# Patient Record
Sex: MALE | Race: WHITE | Employment: OTHER | URBAN - METROPOLITAN AREA
[De-identification: names, ages, dates, MRNs, and addresses within clinical notes are randomized per-mention and may not be internally consistent; named-entity substitution may affect disease eponyms.]

---

## 2017-03-14 DIAGNOSIS — R74.8 ELEVATED LIVER ENZYMES: ICD-10-CM

## 2017-03-14 DIAGNOSIS — E78.5 DYSLIPIDEMIA: ICD-10-CM

## 2017-03-14 DIAGNOSIS — K76.0 FATTY LIVER: ICD-10-CM

## 2017-03-14 NOTE — TELEPHONE ENCOUNTER
Medication refill. Requested Prescriptions     Pending Prescriptions Disp Refills    ezetimibe (ZETIA) 10 mg tablet 30 Tab 2     Sig: Take 1 Tab by mouth daily.  Repeat liver and lipid labs needed one month  Indications: hyperlipidemia

## 2017-03-17 RX ORDER — EZETIMIBE 10 MG/1
10 TABLET ORAL DAILY
Qty: 30 TAB | Refills: 2 | Status: SHIPPED | OUTPATIENT
Start: 2017-03-17 | End: 2017-05-17 | Stop reason: SDUPTHER

## 2017-05-17 ENCOUNTER — OFFICE VISIT (OUTPATIENT)
Dept: FAMILY MEDICINE CLINIC | Age: 42
End: 2017-05-17

## 2017-05-17 VITALS
BODY MASS INDEX: 42.13 KG/M2 | RESPIRATION RATE: 18 BRPM | WEIGHT: 278 LBS | HEIGHT: 68 IN | HEART RATE: 84 BPM | DIASTOLIC BLOOD PRESSURE: 104 MMHG | SYSTOLIC BLOOD PRESSURE: 132 MMHG | TEMPERATURE: 97.4 F | OXYGEN SATURATION: 95 %

## 2017-05-17 DIAGNOSIS — J02.9 SORE THROAT: ICD-10-CM

## 2017-05-17 DIAGNOSIS — E78.00 HIGH CHOLESTEROL: ICD-10-CM

## 2017-05-17 DIAGNOSIS — I11.9 BENIGN HYPERTENSIVE HEART DISEASE WITHOUT HEART FAILURE: ICD-10-CM

## 2017-05-17 DIAGNOSIS — E11.65 TYPE 2 DIABETES MELLITUS WITH HYPERGLYCEMIA, WITHOUT LONG-TERM CURRENT USE OF INSULIN (HCC): Primary | ICD-10-CM

## 2017-05-17 DIAGNOSIS — K76.0 FATTY LIVER: ICD-10-CM

## 2017-05-17 DIAGNOSIS — R74.8 ELEVATED LIVER ENZYMES: ICD-10-CM

## 2017-05-17 DIAGNOSIS — E78.5 DYSLIPIDEMIA: ICD-10-CM

## 2017-05-17 LAB
S PYO AG THROAT QL: POSITIVE
VALID INTERNAL CONTROL?: YES

## 2017-05-17 RX ORDER — HYDROCHLOROTHIAZIDE 25 MG/1
TABLET ORAL
Qty: 90 TAB | Refills: 1 | Status: SHIPPED | OUTPATIENT
Start: 2017-05-17 | End: 2017-10-05 | Stop reason: SDUPTHER

## 2017-05-17 RX ORDER — AMOXICILLIN 500 MG/1
500 CAPSULE ORAL 3 TIMES DAILY
Qty: 30 CAP | Refills: 0 | Status: SHIPPED | OUTPATIENT
Start: 2017-05-17 | End: 2017-05-27

## 2017-05-17 RX ORDER — ATORVASTATIN CALCIUM 10 MG/1
10 TABLET, FILM COATED ORAL DAILY
Qty: 90 TAB | Refills: 1 | Status: SHIPPED | OUTPATIENT
Start: 2017-05-17 | End: 2017-10-05 | Stop reason: SDUPTHER

## 2017-05-17 RX ORDER — METFORMIN HYDROCHLORIDE 500 MG/1
500 TABLET ORAL
Qty: 90 TAB | Refills: 1 | Status: SHIPPED | OUTPATIENT
Start: 2017-05-17 | End: 2017-10-05 | Stop reason: SDUPTHER

## 2017-05-17 RX ORDER — BLOOD-GLUCOSE CONTROL, NORMAL
EACH MISCELLANEOUS
Qty: 200 LANCET | Refills: 1 | Status: SHIPPED | OUTPATIENT
Start: 2017-05-17

## 2017-05-17 RX ORDER — EZETIMIBE 10 MG/1
10 TABLET ORAL DAILY
Qty: 90 TAB | Refills: 1 | Status: SHIPPED | OUTPATIENT
Start: 2017-05-17 | End: 2017-07-07 | Stop reason: SDUPTHER

## 2017-05-17 RX ORDER — LISINOPRIL 10 MG/1
10 TABLET ORAL DAILY
Qty: 90 TAB | Refills: 1 | Status: SHIPPED | OUTPATIENT
Start: 2017-05-17 | End: 2017-10-05 | Stop reason: SDUPTHER

## 2017-05-17 NOTE — PROGRESS NOTES
Adan Bolanos is a 38 y.o. male presents to office for DM, HTN and cholesterol. 1. Have you been to the ER, urgent care clinic or hospitalized since your last visit? no  2. Have you seen any other providers outside of [facility] since your last visit? no  3.  Have you had a Flu shot this year? no      Health Maintenance items with a due date reviewed with patient:  Health Maintenance Due   Topic Date Due    FOOT EXAM Q1  04/25/1983    MICROALBUMIN Q1  04/25/1983    EYE EXAM RETINAL OR DILATED Q1  04/25/1983    Pneumococcal 19-64 Medium Risk (1 of 1 - PPSV23) 04/25/1992    DTaP/Tdap/Td series (1 - Tdap) 04/25/1994    HEMOGLOBIN A1C Q6M  05/10/2017

## 2017-05-17 NOTE — PATIENT INSTRUCTIONS
Learning About Diabetes Food Guidelines  Your Care Instructions  Meal planning is important to manage diabetes. It helps keep your blood sugar at a target level (which you set with your doctor). You don't have to eat special foods. You can eat what your family eats, including sweets once in a while. But you do have to pay attention to how often you eat and how much you eat of certain foods. You may want to work with a dietitian or a certified diabetes educator (CDE) to help you plan meals and snacks. A dietitian or CDE can also help you lose weight if that is one of your goals. What should you know about eating carbs? Managing the amount of carbohydrate (carbs) you eat is an important part of healthy meals when you have diabetes. Carbohydrate is found in many foods. · Learn which foods have carbs. And learn the amounts of carbs in different foods. ¨ Bread, cereal, pasta, and rice have about 15 grams of carbs in a serving. A serving is 1 slice of bread (1 ounce), ½ cup of cooked cereal, or 1/3 cup of cooked pasta or rice. ¨ Fruits have 15 grams of carbs in a serving. A serving is 1 small fresh fruit, such as an apple or orange; ½ of a banana; ½ cup of cooked or canned fruit; ½ cup of fruit juice; 1 cup of melon or raspberries; or 2 tablespoons of dried fruit. ¨ Milk and no-sugar-added yogurt have 15 grams of carbs in a serving. A serving is 1 cup of milk or 2/3 cup of no-sugar-added yogurt. ¨ Starchy vegetables have 15 grams of carbs in a serving. A serving is ½ cup of mashed potatoes or sweet potato; 1 cup winter squash; ½ of a small baked potato; ½ cup of cooked beans; or ½ cup cooked corn or green peas. · Learn how much carbs to eat each day and at each meal. A dietitian or CDE can teach you how to keep track of the amount of carbs you eat. This is called carbohydrate counting. · If you are not sure how to count carbohydrate grams, use the Plate Method to plan meals.  It is a good, quick way to make sure that you have a balanced meal. It also helps you spread carbs throughout the day. ¨ Divide your plate by types of foods. Put non-starchy vegetables on half the plate, meat or other protein food on one-quarter of the plate, and a grain or starchy vegetable in the final quarter of the plate. To this you can add a small piece of fruit and 1 cup of milk or yogurt, depending on how many carbs you are supposed to eat at a meal.  · Try to eat about the same amount of carbs at each meal. Do not \"save up\" your daily allowance of carbs to eat at one meal.  · Proteins have very little or no carbs per serving. Examples of proteins are beef, chicken, turkey, fish, eggs, tofu, cheese, cottage cheese, and peanut butter. A serving size of meat is 3 ounces, which is about the size of a deck of cards. Examples of meat substitute serving sizes (equal to 1 ounce of meat) are 1/4 cup of cottage cheese, 1 egg, 1 tablespoon of peanut butter, and ½ cup of tofu. How can you eat out and still eat healthy? · Learn to estimate the serving sizes of foods that have carbohydrate. If you measure food at home, it will be easier to estimate the amount in a serving of restaurant food. · If the meal you order has too much carbohydrate (such as potatoes, corn, or baked beans), ask to have a low-carbohydrate food instead. Ask for a salad or green vegetables. · If you use insulin, check your blood sugar before and after eating out to help you plan how much to eat in the future. · If you eat more carbohydrate at a meal than you had planned, take a walk or do other exercise. This will help lower your blood sugar. What else should you know? · Limit saturated fat, such as the fat from meat and dairy products. This is a healthy choice because people who have diabetes are at higher risk of heart disease. So choose lean cuts of meat and nonfat or low-fat dairy products. Use olive or canola oil instead of butter or shortening when cooking.   · Don't skip meals. Your blood sugar may drop too low if you skip meals and take insulin or certain medicines for diabetes. · Check with your doctor before you drink alcohol. Alcohol can cause your blood sugar to drop too low. Alcohol can also cause a bad reaction if you take certain diabetes medicines. Follow-up care is a key part of your treatment and safety. Be sure to make and go to all appointments, and call your doctor if you are having problems. It's also a good idea to know your test results and keep a list of the medicines you take. Where can you learn more? Go to http://angie-jimi.info/. Enter J638 in the search box to learn more about \"Learning About Diabetes Food Guidelines. \"  Current as of: May 23, 2016  Content Version: 11.2  © 1561-7004 [employer]. Care instructions adapted under license by [employer] (which disclaims liability or warranty for this information). If you have questions about a medical condition or this instruction, always ask your healthcare professional. [place] [address] any warranty or liability for your use of this information. Low Sodium Diet (2,000 Milligram): Care Instructions  Your Care Instructions  Too much sodium causes your body to hold on to extra water. This can raise your blood pressure and force your heart and kidneys to work harder. In very serious cases, this could cause you to be put in the hospital. It might even be life-threatening. By limiting sodium, you will feel better and lower your risk of serious problems. The most common source of sodium is salt. People get most of the salt in their diet from canned, prepared, and packaged foods. Fast food and restaurant meals also are very high in sodium. Your doctor will probably limit your sodium to less than 2,000 milligrams (mg) a day. This limit counts all the sodium in prepared and packaged foods and any salt you add to your food.   Follow-up care is a key part of your treatment and safety. Be sure to make and go to all appointments, and call your doctor if you are having problems. It's also a good idea to know your test results and keep a list of the medicines you take. How can you care for yourself at home? Read food labels  · Read labels on cans and food packages. The labels tell you how much sodium is in each serving. Make sure that you look at the serving size. If you eat more than the serving size, you have eaten more sodium. · Food labels also tell you the Percent Daily Value for sodium. Choose products with low Percent Daily Values for sodium. · Be aware that sodium can come in forms other than salt, including monosodium glutamate (MSG), sodium citrate, and sodium bicarbonate (baking soda). MSG is often added to Asian food. When you eat out, you can sometimes ask for food without MSG or added salt. Buy low-sodium foods  · Buy foods that are labeled \"unsalted\" (no salt added), \"sodium-free\" (less than 5 mg of sodium per serving), or \"low-sodium\" (less than 140 mg of sodium per serving). Foods labeled \"reduced-sodium\" and \"light sodium\" may still have too much sodium. Be sure to read the label to see how much sodium you are getting. · Buy fresh vegetables, or frozen vegetables without added sauces. Buy low-sodium versions of canned vegetables, soups, and other canned goods. Prepare low-sodium meals  · Cut back on the amount of salt you use in cooking. This will help you adjust to the taste. Do not add salt after cooking. One teaspoon of salt has about 2,300 mg of sodium. · Take the salt shaker off the table. · Flavor your food with garlic, lemon juice, onion, vinegar, herbs, and spices. Do not use soy sauce, lite soy sauce, steak sauce, onion salt, garlic salt, celery salt, mustard, or ketchup on your food. · Use low-sodium salad dressings, sauces, and ketchup. Or make your own salad dressings and sauces without adding salt.   · Use less salt (or none) when recipes call for it. You can often use half the salt a recipe calls for without losing flavor. Other foods such as rice, pasta, and grains do not need added salt. · Rinse canned vegetables, and cook them in fresh water. This removes somebut not allof the salt. · Avoid water that is naturally high in sodium or that has been treated with water softeners, which add sodium. Call your local water company to find out the sodium content of your water supply. If you buy bottled water, read the label and choose a sodium-free brand. Avoid high-sodium foods  · Avoid eating:  ¨ Smoked, cured, salted, and canned meat, fish, and poultry. ¨ Ham, simeon, hot dogs, and luncheon meats. ¨ Regular, hard, and processed cheese and regular peanut butter. ¨ Crackers with salted tops, and other salted snack foods such as pretzels, chips, and salted popcorn. ¨ Frozen prepared meals, unless labeled low-sodium. ¨ Canned and dried soups, broths, and bouillon, unless labeled sodium-free or low-sodium. ¨ Canned vegetables, unless labeled sodium-free or low-sodium. ¨ [place] fries, pizza, tacos, and other fast foods. ¨ Pickles, olives, ketchup, and other condiments, especially soy sauce, unless labeled sodium-free or low-sodium. Where can you learn more? Go to http://angie-jimi.info/. Enter S436 in the search box to learn more about \"Low Sodium Diet (2,000 Milligram): Care Instructions. \"  Current as of: July 26, 2016  Content Version: 11.2  © 3864-7283 [employer]. Care instructions adapted under license by [employer] (which disclaims liability or warranty for this information). If you have questions about a medical condition or this instruction, always ask your healthcare professional. [place] [address] any warranty or liability for your use of this information.        DASH Diet: Care Instructions  Your Care Instructions  The DASH diet is an eating plan that can help lower your blood pressure. DASH stands for Dietary Approaches to Stop Hypertension. Hypertension is high blood pressure. The DASH diet focuses on eating foods that are high in calcium, potassium, and magnesium. These nutrients can lower blood pressure. The foods that are highest in these nutrients are fruits, vegetables, low-fat dairy products, nuts, seeds, and legumes. But taking calcium, potassium, and magnesium supplements instead of eating foods that are high in those nutrients does not have the same effect. The DASH diet also includes whole grains, fish, and poultry. The DASH diet is one of several lifestyle changes your doctor may recommend to lower your high blood pressure. Your doctor may also want you to decrease the amount of sodium in your diet. Lowering sodium while following the DASH diet can lower blood pressure even further than just the DASH diet alone. Follow-up care is a key part of your treatment and safety. Be sure to make and go to all appointments, and call your doctor if you are having problems. It's also a good idea to know your test results and keep a list of the medicines you take. How can you care for yourself at home? Following the DASH diet  · Eat 4 to 5 servings of fruit each day. A serving is 1 medium-sized piece of fruit, ½ cup chopped or canned fruit, 1/4 cup dried fruit, or 4 ounces (½ cup) of fruit juice. Choose fruit more often than fruit juice. · Eat 4 to 5 servings of vegetables each day. A serving is 1 cup of lettuce or raw leafy vegetables, ½ cup of chopped or cooked vegetables, or 4 ounces (½ cup) of vegetable juice. Choose vegetables more often than vegetable juice. · Get 2 to 3 servings of low-fat and fat-free dairy each day. A serving is 8 ounces of milk, 1 cup of yogurt, or 1 ½ ounces of cheese. · Eat 6 to 8 servings of grains each day.  A serving is 1 slice of bread, 1 ounce of dry cereal, or ½ cup of cooked rice, pasta, or cooked cereal. Try to choose whole-grain products as much as possible. · Limit lean meat, poultry, and fish to 2 servings each day. A serving is 3 ounces, about the size of a deck of cards. · Eat 4 to 5 servings of nuts, seeds, and legumes (cooked dried beans, lentils, and split peas) each week. A serving is 1/3 cup of nuts, 2 tablespoons of seeds, or ½ cup of cooked beans or peas. · Limit fats and oils to 2 to 3 servings each day. A serving is 1 teaspoon of vegetable oil or 2 tablespoons of salad dressing. · Limit sweets and added sugars to 5 servings or less a week. A serving is 1 tablespoon jelly or jam, ½ cup sorbet, or 1 cup of lemonade. · Eat less than 2,300 milligrams (mg) of sodium a day. If you limit your sodium to 1,500 mg a day, you can lower your blood pressure even more. Tips for success  · Start small. Do not try to make dramatic changes to your diet all at once. You might feel that you are missing out on your favorite foods and then be more likely to not follow the plan. Make small changes, and stick with them. Once those changes become habit, add a few more changes. · Try some of the following:  ¨ Make it a goal to eat a fruit or vegetable at every meal and at snacks. This will make it easy to get the recommended amount of fruits and vegetables each day. ¨ Try yogurt topped with fruit and nuts for a snack or healthy dessert. ¨ Add lettuce, tomato, cucumber, and onion to sandwiches. ¨ Combine a ready-made pizza crust with low-fat mozzarella cheese and lots of vegetable toppings. Try using tomatoes, squash, spinach, broccoli, carrots, cauliflower, and onions. ¨ Have a variety of cut-up vegetables with a low-fat dip as an appetizer instead of chips and dip. ¨ Sprinkle sunflower seeds or chopped almonds over salads. Or try adding chopped walnuts or almonds to cooked vegetables. ¨ Try some vegetarian meals using beans and peas. Add garbanzo or kidney beans to salads.  Make burritos and tacos with mashed cote beans or black beans. Where can you learn more? Go to http://angie-jimi.info/. Enter A941 in the search box to learn more about \"DASH Diet: Care Instructions. \"  Current as of: March 23, 2016  Content Version: 11.2  © 8705-9810 [employer]. Care instructions adapted under license by [employer] (which disclaims liability or warranty for this information). If you have questions about a medical condition or this instruction, always ask your healthcare professional. [address] any warranty or liability for your use of this information.

## 2017-05-17 NOTE — PROGRESS NOTES
Adan Bolanos is a 38 y.o.  male and presents with f/U for med refills and monitoring labs for chronic conditions below. He also has several days of sore throat and cough. Chief Complaint   Patient presents with    Diabetes    Hypertension    Cholesterol Problem     Subjective: Additional Concerns: none    Patient Active Problem List    Diagnosis Date Noted    Type 2 diabetes mellitus with hyperglycemia, without long-term current use of insulin ([address] 12/29/2016    Fatty liver 06/03/2016    Hypertension     Dyslipidemia     Abnormal EKG     Sleep apnea      Current Outpatient Prescriptions   Medication Sig Dispense Refill    ezetimibe (ZETIA) 10 mg tablet Take 1 Tab by mouth daily. Repeat liver and lipid labs needed one month  Indications: hyperlipidemia 90 Tab 1    atorvastatin (LIPITOR) 10 mg tablet Take 1 Tab by mouth daily. 90 Tab 1    metFORMIN (GLUCOPHAGE) 500 mg tablet Take 1 Tab by mouth daily (with breakfast). Indications: type 2 diabetes mellitus 90 Tab 1    hydroCHLOROthiazide (HYDRODIURIL) 25 mg tablet TAKE 1 TABLET BY MOUTH DAILY FOR HIGH BLOOD PRESSURE 90 Tab 1    lisinopril (PRINIVIL, ZESTRIL) 10 mg tablet Take 1 Tab by mouth daily. 90 Tab 1    glucose blood VI test strips (ONETOUCH VERIO) strip Pt to test blood sugar one time daily 200 Strip 1    lancets (ONETOUCH DELICA LANCETS) 30 gauge misc Pt to test blood sugar one time daily 200 Lancet 1    amoxicillin (AMOXIL) 500 mg capsule Take 1 Cap by mouth three (3) times daily for 10 days. 30 Cap 0    cpap machine kit by Does Not Apply route. Change BiPAP to 17/13 cm with humidifier. Mask: Simplus FF, medium size. Length of need 99 months. Replace mask and accessories as needed times 12 months. Download data at 30 days and fax at [phone]. Dx-Severe HANNA, Morbid obesity. DME- [facility] 1 Kit 0    hydroCHLOROthiazide (HYDRODIURIL) 25 mg tablet Take 1 Tab by mouth daily.  90 Tab 1     No Known Allergies  Past Medical History: Diagnosis Date    Contact dermatitis     Dyslipidemia     Erectile dysfunction     Fatty liver 6/3/2016    Hypertension     Obesity     Sleep apnea     Type 2 diabetes mellitus with hyperglycemia, without long-term current use of insulin [facility] 12/29/2016     Past Surgical History:   Procedure Laterality Date    HX APPENDECTOMY      HX KNEE ARTHROSCOPY  2013    left knee     Family History   Problem Relation Age of Onset    Hypertension Mother     No Known Problems Father     Cancer Sister      breast    Hypertension Maternal Grandmother     No Known Problems Sister     No Known Problems Sister     No Known Problems Sister      Social History   Substance Use Topics    Smoking status: Former Smoker     Types: Cigarettes     Quit date: 9/1/2014    Smokeless tobacco: Never Used      Comment: quit cigarettes about 4 months ago:9/2014    Alcohol use No     ROS     General: negative for - chills, fatigue, fever, weight change  Psych: negative for - anxiety, depression, irritability or mood swings  ENT: negative for - headaches, hearing change, nasal congestion, oral lesions, sneezing, positive for  sore throat  Endo: negative for - hot flashes, polydipsia/polyuria or temperature intolerance  Resp: negative for - cough, shortness of breath or wheezing  CV: negative for - chest pain, edema or palpitations  GI: negative for - abdominal pain, change in bowel habits, constipation, diarrhea or nausea/vomiting  MSK: negative for - joint pain, joint swelling or muscle pain  Neuro: negative for - confusion, headaches, seizures or weakness    Objective:  Vitals:    05/17/17 1045   BP: (!) 132/104   Pulse: 84   Resp: 18   Temp: 97.4 °F (36.3 °C)   TempSrc: Oral   SpO2: 95%   Weight: 278 lb (126.1 kg)   Height: 5' 7.75\" (1.721 m)     PE    Alert, well appearing, and in no distress, oriented to person, place, and time and overweight  Mental status - alert, oriented to person, place, and time, normal mood, behavior, speech, dress, motor activity, and thought processes  Mouth - mucous membranes moist, pharynx normal without lesions  Chest - clear to auscultation, no wheezes, rales or rhonchi, symmetric air entry  Heart - normal rate, regular rhythm, normal S1, S2, no murmurs, rubs, clicks or gallops  Extremities - peripheral pulses normal, no pedal edema, no clubbing or cyanosis    LABS   Office Visit on 12/28/2016   Component Date Value Ref Range Status    WBC 12/28/2016 11.6* 4.0 - 11.0 K/uL Final    RBC 12/28/2016 4.93  3.80 - 5.80 M/uL Final    HGB 12/28/2016 14.6  13.2 - 17.3 g/dL Final    HCT 12/28/2016 43.7  36.6 - 51.9 % Final    MCV 12/28/2016 89  80 - 95 fL Final    MCH 12/28/2016 30  26 - 34 pg Final    MCHC 12/28/2016 33  32 - 36 g/dL Final    RDW 12/28/2016 12.9  10.0 - 16.0 % Final    PLATELET 39/73/7083 088  140 - 440 K/uL Final    MPV 12/28/2016 10.0  6.0 - 10.8 fL Final    NEUTROPHILS 12/28/2016 49  40 - 75 % Final    Lymphocytes 12/28/2016 39  27 - 45 % Final    MONOCYTES 12/28/2016 8  3 - 9 % Final    EOSINOPHILS 12/28/2016 4  0 - 6 % Final    BASOPHILS 12/28/2016 0  0 - 2 % Final    ABS. NEUTROPHILS 12/28/2016 5.7  1.8 - 7.7 K/uL Final    ABSOLUTE LYMPHOCYTE COUNT 12/28/2016 4.5  1.0 - 4.8 K/uL Final    ABS. MONOCYTES 12/28/2016 0.9  0.1 - 0.9 K/uL Final    ABS. EOSINOPHILS 12/28/2016 0.5  0.0 - 0.5 K/uL Final    ABS.  BASOPHILS 12/28/2016 0.0  0.0 - 0.2 K/uL Final    TSH 12/28/2016 1.80  0.27 - 4.20 mcU/mL Final    Calcitriol (Vit D 1, 25 di-OH) 12/28/2016 46.8  19.9 - 79 pg/mL Final    Comment: Performed at:  [address]  [id number]  : Zaid Coffey MD, Phone:  [phone]         TESTS  Results for orders placed or performed in visit on 12/28/16   CBC WITH AUTOMATED DIFF   Result Value Ref Range    WBC 11.6 (H) 4.0 - 11.0 K/uL    RBC 4.93 3.80 - 5.80 M/uL    HGB 14.6 13.2 - 17.3 g/dL    HCT 43.7 36.6 - 51.9 %    MCV 89 80 - 95 fL    MCH 30 26 - 34 pg    MCHC 33 32 - 36 g/dL    RDW 12.9 10.0 - 16.0 %    PLATELET 280 873 - 285 K/uL    MPV 10.0 6.0 - 10.8 fL    NEUTROPHILS 49 40 - 75 %    Lymphocytes 39 27 - 45 %    MONOCYTES 8 3 - 9 %    EOSINOPHILS 4 0 - 6 %    BASOPHILS 0 0 - 2 %    ABS. NEUTROPHILS 5.7 1.8 - 7.7 K/uL    ABSOLUTE LYMPHOCYTE COUNT 4.5 1.0 - 4.8 K/uL    ABS. MONOCYTES 0.9 0.1 - 0.9 K/uL    ABS. EOSINOPHILS 0.5 0.0 - 0.5 K/uL    ABS. BASOPHILS 0.0 0.0 - 0.2 K/uL   TSH 3RD GENERATION   Result Value Ref Range    TSH 1.80 0.27 - 4.20 mcU/mL   VITAMIN D, 1, 25 DIHYDROXY   Result Value Ref Range    Calcitriol (Vit D 1, 25 di-OH) 46.8 19.9 - 79 pg/mL     Assessment/Plan:      1. Type 2 diabetes mellitus with hyperglycemia, without long-term current use of insulin (HCC)  - LIPID PANEL; Future  - METABOLIC PANEL, COMPREHENSIVE; Future  - HEMOGLOBIN A1C WITH EAG; Future  - metFORMIN (GLUCOPHAGE) 500 mg tablet; Take 1 Tab by mouth daily (with breakfast). Indications: type 2 diabetes mellitus  Dispense: 90 Tab; Refill: 1    2. Dyslipidemia  - ezetimibe (ZETIA) 10 mg tablet; Take 1 Tab by mouth daily. Repeat liver and lipid labs needed one month  Indications: hyperlipidemia  Dispense: 90 Tab; Refill: 1    3. Fatty liver  - ezetimibe (ZETIA) 10 mg tablet; Take 1 Tab by mouth daily. Repeat liver and lipid labs needed one month  Indications: hyperlipidemia  Dispense: 90 Tab; Refill: 1    4. Elevated liver enzymes  - ezetimibe (ZETIA) 10 mg tablet; Take 1 Tab by mouth daily. Repeat liver and lipid labs needed one month  Indications: hyperlipidemia  Dispense: 90 Tab; Refill: 1    5. High cholesterol  - atorvastatin (LIPITOR) 10 mg tablet; Take 1 Tab by mouth daily. Dispense: 90 Tab; Refill: 1    6. Hypertension uncontrolled  - added lisinopril 10 mg po daily.  Patient to check BP at home to make sure BP less than 130/80.   - hydroCHLOROthiazide (HYDRODIURIL) 25 mg tablet; TAKE 1 TABLET BY MOUTH DAILY FOR HIGH BLOOD PRESSURE Dispense: 90 Tab; Refill: 1    7. Acute strep pharyngitis - Symptomatic treatment plus amox x 10 days . Lab review: orders written for new lab studies as appropriate; see orders    I have discussed the diagnosis with the patient and the intended plan as seen in the above orders. The patient has received an after-visit summary and questions were answered concerning future plans. I have discussed medication side effects and warnings with the patient as well. I have reviewed the plan of care with the patient, accepted their input and they are in agreement with the treatment goals. Follow-up Disposition:  Return in about 6 months (around 11/17/2017), or if symptoms worsen or fail to improve.     Vincenzo Dooley MD

## 2017-05-18 LAB
A-G RATIO,AGRAT: 1.7 RATIO (ref 1.1–2.6)
ALBUMIN SERPL-MCNC: 4.4 G/DL (ref 3.5–5)
ALP SERPL-CCNC: 101 U/L (ref 25–115)
ALT SERPL-CCNC: 66 U/L (ref 5–40)
ANION GAP SERPL CALC-SCNC: 18 MMOL/L
AST SERPL W P-5'-P-CCNC: 34 U/L (ref 10–37)
AVG GLU, 10930: 142 MG/DL (ref 91–123)
BILIRUB SERPL-MCNC: 0.3 MG/DL (ref 0.2–1.2)
BUN SERPL-MCNC: 15 MG/DL (ref 6–22)
CALCIUM SERPL-MCNC: 9.5 MG/DL (ref 8.4–10.4)
CHLORIDE SERPL-SCNC: 99 MMOL/L (ref 98–110)
CHOLEST SERPL-MCNC: 204 MG/DL (ref 110–200)
CO2 SERPL-SCNC: 26 MMOL/L (ref 20–32)
CREAT SERPL-MCNC: 0.5 MG/DL (ref 0.5–1.2)
GFRAA, 66117: >60
GFRNA, 66118: >60
GLOBULIN,GLOB: 2.6 G/DL (ref 2–4)
GLUCOSE SERPL-MCNC: 105 MG/DL (ref 65–99)
HBA1C MFR BLD HPLC: 6.6 % (ref 4.8–5.9)
HDLC SERPL-MCNC: 35 MG/DL (ref 40–59)
LDLC SERPL CALC-MCNC: 139 MG/DL (ref 50–99)
POTASSIUM SERPL-SCNC: 4.1 MMOL/L (ref 3.5–5.5)
PROT SERPL-MCNC: 7 G/DL (ref 6.4–8.3)
SODIUM SERPL-SCNC: 143 MMOL/L (ref 133–145)
TRIGL SERPL-MCNC: 150 MG/DL (ref 40–149)
VLDLC SERPL CALC-MCNC: 30 MG/DL (ref 8–30)

## 2017-05-18 NOTE — PROGRESS NOTES
Pls inform patient in [facility] that we need to increase his lipitor from 1- to 20 mg and his DM med to Janumet XR 50/1000 BID. Pls pend meds and F/U in 3 months. His FLP and A1C essentially the same as last check.

## 2017-06-05 NOTE — TELEPHONE ENCOUNTER
Dr. Reymundo Collins, please see refill request for patient, thank you! Requested Prescriptions     Pending Prescriptions Disp Refills    atorvastatin (LIPITOR) 20 mg tablet 90 Tab 2     Sig: Take 1 Tab by mouth daily.  SITagliptin-metFORMIN (JANUMET) 50-1,000 mg per tablet 180 Tab 2     Sig: Take 1 Tab by mouth two (2) times daily (with meals).

## 2017-06-05 NOTE — TELEPHONE ENCOUNTER
Pt calling f/u on meds that were suppose to be changed.  Pt also following up on cpap & mask he would like to be referred to another dr b/c he can no longer see dr Deangelo Hubbard for it

## 2017-06-06 RX ORDER — ATORVASTATIN CALCIUM 20 MG/1
20 TABLET, FILM COATED ORAL DAILY
Qty: 90 TAB | Refills: 2 | Status: SHIPPED | OUTPATIENT
Start: 2017-06-06 | End: 2017-07-07 | Stop reason: SDUPTHER

## 2017-06-13 ENCOUNTER — TELEPHONE (OUTPATIENT)
Dept: FAMILY MEDICINE CLINIC | Age: 42
End: 2017-06-13

## 2017-06-13 NOTE — TELEPHONE ENCOUNTER
Patient's friend, Celia Rome (who is on permission to release) calling in to state patient feels like he is on too much medication. He overall feels ill, but cant describe the feeling. No c/o chest pain, nausea, vomiting, sob, or dizziness. He never got the lipitor or Janumet because the pharmacy states they didn't get the rx. Spoke with pharmacist and she states that the patient already picked up the lipitor and Janumet, but not the Zetia. Dr. Reymundo Collins,     Should patient still  the Zetia?

## 2017-07-07 DIAGNOSIS — I11.9 BENIGN HYPERTENSIVE HEART DISEASE WITHOUT HEART FAILURE: ICD-10-CM

## 2017-07-07 DIAGNOSIS — R74.8 ELEVATED LIVER ENZYMES: ICD-10-CM

## 2017-07-07 DIAGNOSIS — E78.5 DYSLIPIDEMIA: ICD-10-CM

## 2017-07-07 DIAGNOSIS — K76.0 FATTY LIVER: ICD-10-CM

## 2017-07-07 NOTE — TELEPHONE ENCOUNTER
Pt calling to request medication refill of:  Requested Prescriptions     Pending Prescriptions Disp Refills    hydroCHLOROthiazide (HYDRODIURIL) 25 mg tablet 90 Tab 1     Sig: Take 1 Tab by mouth daily.  atorvastatin (LIPITOR) 20 mg tablet 90 Tab 2     Sig: Take 1 Tab by mouth daily.  ezetimibe (ZETIA) 10 mg tablet 90 Tab 1     Sig: Take 1 Tab by mouth daily. Repeat liver and lipid labs needed one month  Indications: hyperlipidemia          be sent to [facility]   Pt has about 0 tabs remaining. Pt traveled to [facility] which had meds in there     Pts last appt was 5/17  Advised pt of 72 hour time frame for refill requests. Please advise.

## 2017-07-10 NOTE — TELEPHONE ENCOUNTER
Dr. Mahsa Milian, please see refill request for patient of Celine Ring, thank you! Requested Prescriptions     Pending Prescriptions Disp Refills    hydroCHLOROthiazide (HYDRODIURIL) 25 mg tablet 90 Tab 1     Sig: Take 1 Tab by mouth daily.  atorvastatin (LIPITOR) 20 mg tablet 90 Tab 2     Sig: Take 1 Tab by mouth daily.  ezetimibe (ZETIA) 10 mg tablet 90 Tab 1     Sig: Take 1 Tab by mouth daily.  Repeat liver and lipid labs needed one month  Indications: hyperlipidemia

## 2017-07-11 RX ORDER — HYDROCHLOROTHIAZIDE 25 MG/1
25 TABLET ORAL DAILY
Qty: 90 TAB | Refills: 1 | Status: SHIPPED | OUTPATIENT
Start: 2017-07-11 | End: 2017-10-05 | Stop reason: SDUPTHER

## 2017-07-11 RX ORDER — ATORVASTATIN CALCIUM 20 MG/1
20 TABLET, FILM COATED ORAL DAILY
Qty: 90 TAB | Refills: 2 | Status: SHIPPED | OUTPATIENT
Start: 2017-07-11 | End: 2017-10-05 | Stop reason: SDUPTHER

## 2017-07-11 RX ORDER — EZETIMIBE 10 MG/1
10 TABLET ORAL DAILY
Qty: 90 TAB | Refills: 1 | Status: SHIPPED | OUTPATIENT
Start: 2017-07-11 | End: 2017-10-05 | Stop reason: SDUPTHER

## 2017-10-05 ENCOUNTER — OFFICE VISIT (OUTPATIENT)
Dept: FAMILY MEDICINE CLINIC | Age: 42
End: 2017-10-05

## 2017-10-05 VITALS
HEART RATE: 80 BPM | TEMPERATURE: 97.8 F | BODY MASS INDEX: 43.19 KG/M2 | DIASTOLIC BLOOD PRESSURE: 103 MMHG | HEIGHT: 67 IN | WEIGHT: 275.2 LBS | SYSTOLIC BLOOD PRESSURE: 140 MMHG | OXYGEN SATURATION: 97 % | RESPIRATION RATE: 17 BRPM

## 2017-10-05 DIAGNOSIS — R74.8 ELEVATED LIVER ENZYMES: ICD-10-CM

## 2017-10-05 DIAGNOSIS — E78.00 HIGH CHOLESTEROL: ICD-10-CM

## 2017-10-05 DIAGNOSIS — K76.0 FATTY LIVER: ICD-10-CM

## 2017-10-05 DIAGNOSIS — G47.33 OSA (OBSTRUCTIVE SLEEP APNEA): Primary | ICD-10-CM

## 2017-10-05 DIAGNOSIS — E11.65 TYPE 2 DIABETES MELLITUS WITH HYPERGLYCEMIA, WITHOUT LONG-TERM CURRENT USE OF INSULIN (HCC): ICD-10-CM

## 2017-10-05 DIAGNOSIS — I11.9 BENIGN HYPERTENSIVE HEART DISEASE WITHOUT HEART FAILURE: ICD-10-CM

## 2017-10-05 DIAGNOSIS — E78.5 DYSLIPIDEMIA: ICD-10-CM

## 2017-10-05 RX ORDER — EZETIMIBE 10 MG/1
10 TABLET ORAL DAILY
Qty: 90 TAB | Refills: 1 | Status: SHIPPED | OUTPATIENT
Start: 2017-10-05 | End: 2018-01-26 | Stop reason: SDUPTHER

## 2017-10-05 RX ORDER — LISINOPRIL 10 MG/1
10 TABLET ORAL DAILY
Qty: 90 TAB | Refills: 1 | Status: SHIPPED | OUTPATIENT
Start: 2017-10-05 | End: 2017-10-05

## 2017-10-05 RX ORDER — HYDROCHLOROTHIAZIDE 25 MG/1
25 TABLET ORAL DAILY
Qty: 90 TAB | Refills: 1 | Status: SHIPPED | OUTPATIENT
Start: 2017-10-05 | End: 2018-01-26 | Stop reason: SDUPTHER

## 2017-10-05 RX ORDER — LISINOPRIL 20 MG/1
20 TABLET ORAL DAILY
Qty: 90 TAB | Refills: 1 | Status: SHIPPED | OUTPATIENT
Start: 2017-10-05 | End: 2018-01-26 | Stop reason: SDUPTHER

## 2017-10-05 RX ORDER — ATORVASTATIN CALCIUM 20 MG/1
20 TABLET, FILM COATED ORAL DAILY
Qty: 90 TAB | Refills: 2 | Status: SHIPPED | OUTPATIENT
Start: 2017-10-05 | End: 2018-03-01 | Stop reason: SDUPTHER

## 2017-10-05 RX ORDER — METFORMIN HYDROCHLORIDE 500 MG/1
500 TABLET ORAL
Qty: 90 TAB | Refills: 1 | Status: SHIPPED | OUTPATIENT
Start: 2017-10-05 | End: 2018-01-26 | Stop reason: SDUPTHER

## 2017-10-05 RX ORDER — ATORVASTATIN CALCIUM 10 MG/1
10 TABLET, FILM COATED ORAL DAILY
Qty: 90 TAB | Refills: 1 | Status: SHIPPED | OUTPATIENT
Start: 2017-10-05 | End: 2018-01-26 | Stop reason: SDUPTHER

## 2017-10-05 NOTE — PROGRESS NOTES
Wesly Arriaga is a 38 y.o.  male and presents with F/U as HTN, high chol, DM2. Patient has elevated BP today  And apparently elevated at home as well. Chief Complaint   Patient presents with    Hypertension    Diabetes    Cholesterol Problem     Subjective: Additional Concerns: none    Patient Active Problem List    Diagnosis Date Noted    Type 2 diabetes mellitus with hyperglycemia, without long-term current use of insulin ([facility] [address] 12/29/2016    Fatty liver 06/03/2016    Hypertension     Dyslipidemia     Abnormal EKG     Sleep apnea      Current Outpatient Prescriptions   Medication Sig Dispense Refill    hydroCHLOROthiazide (HYDRODIURIL) 25 mg tablet Take 1 Tab by mouth daily. 90 Tab 1    atorvastatin (LIPITOR) 20 mg tablet Take 1 Tab by mouth daily. 90 Tab 2    ezetimibe (ZETIA) 10 mg tablet Take 1 Tab by mouth daily. Repeat liver and lipid labs needed one month  Indications: hyperlipidemia 90 Tab 1    SITagliptin-metFORMIN (JANUMET) 50-1,000 mg per tablet Take 1 Tab by mouth two (2) times daily (with meals). 180 Tab 2    metFORMIN (GLUCOPHAGE) 500 mg tablet Take 1 Tab by mouth daily (with breakfast). Indications: type 2 diabetes mellitus 90 Tab 1    atorvastatin (LIPITOR) 10 mg tablet Take 1 Tab by mouth daily. 90 Tab 1    lisinopril (PRINIVIL, ZESTRIL) 20 mg tablet Take 1 Tab by mouth daily. Indications: hypertension 90 Tab 1    glucose blood VI test strips (ONETOUCH VERIO) strip Pt to test blood sugar one time daily 200 Strip 1    lancets (ONETOUCH DELICA LANCETS) 30 gauge misc Pt to test blood sugar one time daily 200 Lancet 1    cpap machine kit by Does Not Apply route. Change BiPAP to 17/13 cm with humidifier. Mask: Simplus FF, medium size. Length of need 99 months. Replace mask and accessories as needed times 12 months. Download data at 30 days and fax at [phone]. Dx-Severe HANNA, Morbid obesity.  DME- [facility] 1 Kit 0     No Known Allergies  Past Medical History:   Diagnosis Date    Contact dermatitis     Dyslipidemia     Erectile dysfunction     Fatty liver 6/3/2016    Hypertension     Obesity     Sleep apnea     Type 2 diabetes mellitus with hyperglycemia, without long-term current [facility] 12/29/2016     Past Surgical History:   Procedure Laterality Date    HX APPENDECTOMY      HX KNEE ARTHROSCOPY  2013    left knee     Family History   Problem Relation Age of Onset    Hypertension Mother     No Known Problems Father     Cancer Sister      breast    Hypertension Maternal Grandmother     No Known Problems Sister     No Known Problems Sister     No Known Problems Sister      Social History   Substance Use Topics    Smoking status: Former Smoker     Types: Cigarettes     Quit date: 9/1/2014    Smokeless tobacco: Never Used      Comment: quit cigarettes about 4 months ago:9/2014    Alcohol use No     ROS     General: negative for - chills, fatigue, fever, weight change  Endo: negative for - hot flashes, polydipsia/polyuria or temperature intolerance  Resp: negative for - cough, shortness of breath or wheezing  CV: negative for - chest pain, edema or palpitations  MSK: negative for - joint pain, joint swelling or muscle pain  Neuro: negative for - confusion, headaches, seizures or weakness    Objective:  Vitals:    10/05/17 1039   BP: (!) 140/103   Pulse: 80   Resp: 17   Temp: 97.8 °F (36.6 °C)   TempSrc: Oral   SpO2: 97%   Weight: 275 lb 3.2 oz (124.8 kg)   Height: 5' 7\" (1.702 m)   PainSc:   0 - No pain     PE    Alert, well appearing, and in no distress, oriented to person, place, and time and overweight  Mental status - alert, oriented to person, place, and time, normal mood, behavior, speech, dress, motor activity, and thought processes  Chest - clear to auscultation, no wheezes, rales or rhonchi, symmetric air entry  Heart - normal rate, regular rhythm, normal S1, S2, no murmurs, rubs, clicks or gallops  Extremities - peripheral pulses normal, no pedal edema, no clubbing or cyanosis    LABS   Office Visit on 05/17/2017   Component Date Value Ref Range Status    Triglyceride 05/17/2017 150* 40 - 149 mg/dL Final    HDL Cholesterol 05/17/2017 35* 40 - 59 mg/dL Final    Cholesterol, total 05/17/2017 204* 110 - 200 mg/dL Final    LDL, calculated 05/17/2017 139* 50 - 99 mg/dL Final    VLDL, calculated 05/17/2017 30  8 - 30 mg/dL Final    Comment: Test includes cholesterol, HDL cholesterol, triglycerides and LDL. Cholesterol Recommended NCEP guidelines in mg/dL:  Less than 200      Desirable  200 - 239          Borderline High  Greater than or  = to 240   High      Glucose 05/17/2017 105* 65 - 99 mg/dL Final    BUN 05/17/2017 15  6 - 22 mg/dL Final    Creatinine 05/17/2017 0.5  0.5 - 1.2 mg/dL Final    Sodium 05/17/2017 143  133 - 145 mmol/L Final    Potassium 05/17/2017 4.1  3.5 - 5.5 mmol/L Final    Chloride 05/17/2017 99  98 - 110 mmol/L Final    CO2 05/17/2017 26  20 - 32 mmol/L Final    AST (SGOT) 05/17/2017 34  10 - 37 U/L Final    ALT (SGPT) 05/17/2017 66* 5 - 40 U/L Final    Alk. phosphatase 05/17/2017 101  25 - 115 U/L Final    Bilirubin, total 05/17/2017 0.3  0.2 - 1.2 mg/dL Final    Calcium 05/17/2017 9.5  8.4 - 10.4 mg/dL Final    Protein, total 05/17/2017 7.0  6.4 - 8.3 g/dL Final    Albumin 05/17/2017 4.4  3.5 - 5.0 g/dL Final    A-G Ratio 05/17/2017 1.7  1.1 - 2.6 ratio Final    Globulin 05/17/2017 2.6  2.0 - 4.0 g/dL Final    Anion gap 05/17/2017 18.0  mmol/L Final    Comment: Test includes Albumin, Alkaline Phosphatase, ALT, AST, BUN, Calcium, CO2,  Chloride, Creatinine, Glucose, Potassium, Sodium, Total Bilirubin and Total  Protein. Estimated GFR results are reported in mL/min/1.73 sq.m. by the MDRD equation. This eGFR is validated for stable chronic renal failure patients. This   equation  is unreliable in acute illness or patients with normal renal function.       GFRAA 05/17/2017 >60.0  >60.0 Final    GFRNA 05/17/2017 >60.0  >60.0 Final    VALID INTERNAL CONTROL POC 05/17/2017 Yes   Final    Group A Strep Ag 05/17/2017 Positive  Negative Final    Hemoglobin A1c 05/17/2017 6.6* 4.8 - 5.9 % Final    AVG GLU 05/17/2017 142* 91 - 123 mg/dL Final       TESTS  Results for orders placed or performed in visit on 05/17/17   LIPID PANEL   Result Value Ref Range    Triglyceride 150 (H) 40 - 149 mg/dL    HDL Cholesterol 35 (L) 40 - 59 mg/dL    Cholesterol, total 204 (H) 110 - 200 mg/dL    LDL, calculated 139 (H) 50 - 99 mg/dL    VLDL, calculated 30 8 - 30 mg/dL   METABOLIC PANEL, COMPREHENSIVE   Result Value Ref Range    Glucose 105 (H) 65 - 99 mg/dL    BUN 15 6 - 22 mg/dL    Creatinine 0.5 0.5 - 1.2 mg/dL    Sodium 143 133 - 145 mmol/L    Potassium 4.1 3.5 - 5.5 mmol/L    Chloride 99 98 - 110 mmol/L    CO2 26 20 - 32 mmol/L    AST (SGOT) 34 10 - 37 U/L    ALT (SGPT) 66 (H) 5 - 40 U/L    Alk. phosphatase 101 25 - 115 U/L    Bilirubin, total 0.3 0.2 - 1.2 mg/dL    Calcium 9.5 8.4 - 10.4 mg/dL    Protein, total 7.0 6.4 - 8.3 g/dL    Albumin 4.4 3.5 - 5.0 g/dL    A-G Ratio 1.7 1.1 - 2.6 ratio    Globulin 2.6 2.0 - 4.0 g/dL    Anion gap 18.0 mmol/L    GFRAA >60.0 >60.0    GFRNA >60.0 >60.0   HEMOGLOBIN A1C W/O EAG   Result Value Ref Range    Hemoglobin A1c 6.6 (H) 4.8 - 5.9 %    AVG  (H) 91 - 123 mg/dL   AMB POC RAPID STREP A   Result Value Ref Range    VALID INTERNAL CONTROL POC Yes     Group A Strep Ag Positive Negative     Assessment/Plan:      1. Hypertension - uncontrolled. Increased lisinopril from 10 to 20 mg po daily. Patient to monitor BP to make sure comes down to 130/80  Consistently. - hydroCHLOROthiazide (HYDRODIURIL) 25 mg tablet; Take 1 Tab by mouth daily. Dispense: 90 Tab; Refill: 1  - lisinopril (PRINIVIL, ZESTRIL) 20 mg tablet; Take 1 Tab by mouth daily. Indications: hypertension  Dispense: 90 Tab; Refill: 1  - METABOLIC PANEL, COMPREHENSIVE; Future    2. Dyslipidemia  - atorvastatin (LIPITOR) 20 mg tablet;  Take 1 Tab by mouth daily. Dispense: 90 Tab; Refill: 2  - ezetimibe (ZETIA) 10 mg tablet; Take 1 Tab by mouth daily. Repeat liver and lipid labs needed one month  Indications: hyperlipidemia  Dispense: 90 Tab; Refill: 1  - LIPID PANEL; Future    3. Fatty liver  - ezetimibe (ZETIA) 10 mg tablet; Take 1 Tab by mouth daily. Repeat liver and lipid labs needed one month  Indications: hyperlipidemia  Dispense: 90 Tab; Refill: 1  - REFERRAL TO GASTROENTEROLOGY    4. Type 2 diabetes mellitus with hyperglycemia, without long-term current use of insulin (HCC)  - SITagliptin-metFORMIN (JANUMET) 50-1,000 mg per tablet; Take 1 Tab by mouth two (2) times daily (with meals). Dispense: 180 Tab; Refill: 2  - metFORMIN (GLUCOPHAGE) 500 mg tablet; Take 1 Tab by mouth daily (with breakfast). Indications: type 2 diabetes mellitus  Dispense: 90 Tab; Refill: 1  - MICROALBUMIN, UR, RAND W/ MICROALBUMIN/CREA RATIO; Future  - HEMOGLOBIN A1C WITH EAG; Future    5. HANNA (obstructive sleep apnea)  - REFERRAL TO SLEEP STUDIES    Lab review: orders written for new lab studies as appropriate; see orders. I have discussed the diagnosis with the patient and the intended plan as seen in the above orders. The patient has received an after-visit summary and questions were answered concerning future plans. I have discussed medication side effects and warnings with the patient as well. I have reviewed the plan of care with the patient, accepted their input and they are in agreement with the treatment goals. Follow-up Disposition:  Return in about 3 months (around 1/5/2018), or if symptoms worsen or fail to improve.     Linda Naik MD

## 2017-10-05 NOTE — PATIENT INSTRUCTIONS

## 2017-10-05 NOTE — PROGRESS NOTES
Lex Ratliff is a 38 y.o. male presents to office for HTN, DM, and cholesterol. Referral to sleep specialists of [address]  On [facility]  1. Have you been to the ER, urgent care clinic or hospitalized since your last visit?  No            Health Maintenance items with a due date reviewed with patient:  Health Maintenance Due   Topic Date Due    FOOT EXAM Q1  04/25/1983    MICROALBUMIN Q1  04/25/1983    EYE EXAM RETINAL OR DILATED Q1  04/25/1983    Pneumococcal 19-64 Medium Risk (1 of 1 - PPSV23) 04/25/1992    DTaP/Tdap/Td series (1 - Tdap) 04/25/1994    INFLUENZA AGE 9 TO ADULT  08/01/2017

## 2017-10-06 ENCOUNTER — TELEPHONE (OUTPATIENT)
Dept: FAMILY MEDICINE CLINIC | Age: 42
End: 2017-10-06

## 2017-10-06 LAB
A-G RATIO,AGRAT: 1.7 RATIO (ref 1.1–2.6)
ALBUMIN SERPL-MCNC: 4.3 G/DL (ref 3.5–5)
ALP SERPL-CCNC: 108 U/L (ref 25–115)
ALT SERPL-CCNC: 53 U/L (ref 5–40)
ANION GAP SERPL CALC-SCNC: 16 MMOL/L
AST SERPL W P-5'-P-CCNC: 35 U/L (ref 10–37)
AVG GLU, 10930: 131 MG/DL (ref 91–123)
BILIRUB SERPL-MCNC: 0.5 MG/DL (ref 0.2–1.2)
BUN SERPL-MCNC: 7 MG/DL (ref 6–22)
CALCIUM SERPL-MCNC: 9.6 MG/DL (ref 8.4–10.4)
CHLORIDE SERPL-SCNC: 97 MMOL/L (ref 98–110)
CHOLEST SERPL-MCNC: 131 MG/DL (ref 110–200)
CO2 SERPL-SCNC: 29 MMOL/L (ref 20–32)
CREAT SERPL-MCNC: 0.6 MG/DL (ref 0.5–1.2)
CREATININE, URINE: 225 MG/DL
GFRAA, 66117: >60
GFRNA, 66118: >60
GLOBULIN,GLOB: 2.5 G/DL (ref 2–4)
GLUCOSE SERPL-MCNC: 87 MG/DL (ref 70–99)
HBA1C MFR BLD HPLC: 6.2 % (ref 4.8–5.9)
HDLC SERPL-MCNC: 33 MG/DL (ref 40–59)
LDLC SERPL CALC-MCNC: 62 MG/DL (ref 50–99)
MICROALB/CREAT RATIO, 140286: 8.3 MCG/MG OF CREATININE (ref 0–30)
MICROALBUMIN,URINE RANDOM 140054: 18.7 UG/ML (ref 0.1–17)
POTASSIUM SERPL-SCNC: 4.5 MMOL/L (ref 3.5–5.5)
PROT SERPL-MCNC: 6.8 G/DL (ref 6.4–8.3)
SODIUM SERPL-SCNC: 142 MMOL/L (ref 133–145)
TRIGL SERPL-MCNC: 181 MG/DL (ref 40–149)
VLDLC SERPL CALC-MCNC: 36 MG/DL (ref 8–30)

## 2017-10-06 NOTE — PROGRESS NOTES
Pls congratulate patient for reducing his bad chol and A1C. Rest of labs normal to near normal with slight elevation in liver enz. No other intervention is needed  And patient to keep up the good work and avoid alcohol. Recheck in 3 months. FYI patient is Thai speaker.

## 2017-10-06 NOTE — TELEPHONE ENCOUNTER
Patient was informed of lab results and to follow up 3 month. No further question or concern at this time.

## 2018-01-26 ENCOUNTER — OFFICE VISIT (OUTPATIENT)
Dept: FAMILY MEDICINE CLINIC | Age: 43
End: 2018-01-26

## 2018-01-26 VITALS
SYSTOLIC BLOOD PRESSURE: 132 MMHG | RESPIRATION RATE: 17 BRPM | TEMPERATURE: 96.8 F | HEIGHT: 67 IN | WEIGHT: 282.6 LBS | DIASTOLIC BLOOD PRESSURE: 92 MMHG | BODY MASS INDEX: 44.36 KG/M2 | HEART RATE: 103 BPM | OXYGEN SATURATION: 97 %

## 2018-01-26 DIAGNOSIS — R74.8 ELEVATED LIVER ENZYMES: ICD-10-CM

## 2018-01-26 DIAGNOSIS — K76.0 FATTY LIVER: ICD-10-CM

## 2018-01-26 DIAGNOSIS — I10 ESSENTIAL HYPERTENSION: Primary | ICD-10-CM

## 2018-01-26 DIAGNOSIS — E11.65 TYPE 2 DIABETES MELLITUS WITH HYPERGLYCEMIA, WITHOUT LONG-TERM CURRENT USE OF INSULIN (HCC): ICD-10-CM

## 2018-01-26 DIAGNOSIS — I11.9 BENIGN HYPERTENSIVE HEART DISEASE WITHOUT HEART FAILURE: ICD-10-CM

## 2018-01-26 DIAGNOSIS — E78.00 HIGH CHOLESTEROL: ICD-10-CM

## 2018-01-26 DIAGNOSIS — E78.5 DYSLIPIDEMIA: ICD-10-CM

## 2018-01-26 PROBLEM — E66.01 OBESITY, MORBID (HCC): Status: ACTIVE | Noted: 2018-01-26

## 2018-01-26 RX ORDER — LISINOPRIL 20 MG/1
20 TABLET ORAL DAILY
Qty: 90 TAB | Refills: 1 | Status: SHIPPED | OUTPATIENT
Start: 2018-01-26 | End: 2018-01-26 | Stop reason: DRUGHIGH

## 2018-01-26 RX ORDER — LISINOPRIL 40 MG/1
40 TABLET ORAL DAILY
Qty: 90 TAB | Refills: 1 | Status: SHIPPED | OUTPATIENT
Start: 2018-01-26 | End: 2018-04-27 | Stop reason: SDUPTHER

## 2018-01-26 RX ORDER — ATORVASTATIN CALCIUM 10 MG/1
10 TABLET, FILM COATED ORAL DAILY
Qty: 90 TAB | Refills: 1 | Status: SHIPPED | OUTPATIENT
Start: 2018-01-26

## 2018-01-26 RX ORDER — HYDROCHLOROTHIAZIDE 25 MG/1
25 TABLET ORAL DAILY
Qty: 90 TAB | Refills: 1 | Status: SHIPPED | OUTPATIENT
Start: 2018-01-26 | End: 2018-04-27 | Stop reason: SDUPTHER

## 2018-01-26 RX ORDER — EZETIMIBE 10 MG/1
10 TABLET ORAL DAILY
Qty: 90 TAB | Refills: 1 | Status: SHIPPED | OUTPATIENT
Start: 2018-01-26 | End: 2018-04-27 | Stop reason: SDUPTHER

## 2018-01-26 RX ORDER — METFORMIN HYDROCHLORIDE 500 MG/1
500 TABLET ORAL
Qty: 90 TAB | Refills: 1 | Status: SHIPPED | OUTPATIENT
Start: 2018-01-26 | End: 2018-04-27 | Stop reason: SDUPTHER

## 2018-01-26 NOTE — PATIENT INSTRUCTIONS
Learning About Type 2 Diabetes  What is type 2 diabetes? Insulin is a hormone that helps your body use sugar from your food as energy. Type 2 diabetes happens when your body can't use insulin the right way. Over time, the pancreas can't make enough insulin. If you don't have enough insulin, too much sugar stays in your blood. If you are overweight, get little or no exercise, or have type 2 diabetes in your family, you are more likely to have problems with the way insulin works in your body.  Americans, Hispanics, Native Americans,  Americans, and Pacific Islanders have a higher risk for type 2 diabetes. Type 2 diabetes can be prevented or delayed with a healthy lifestyle, which includes staying at a healthy weight, making smart food choices, and getting regular exercise. What can you expect with type 2 diabetes? Joseph Jean keep hearing about how important it is to keep your blood sugar within a target range. That's because over time, high blood sugar can lead to serious problems. It can:  · Harm your eyes, nerves, and kidneys. · Damage your blood vessels, leading to heart disease and stroke. · Reduce blood flow and cause nerve damage to parts of your body, especially your feet. This can cause slow healing and pain when you walk. · Make your immune system weak and less able to fight infections. When people hear the word \"diabetes,\" they often think of problems like these. But daily care and treatment can help prevent or delay these problems. The goal is to keep your blood sugar in a target range. That's the best way to reduce your chance of having more problems from diabetes. What are the symptoms? Some people who have type 2 diabetes may not have any symptoms early on. Many people with the disease don't even know they have it at first. But with time, diabetes starts to cause symptoms. You experience most symptoms of type 2 diabetes when your blood sugar is either too high or too low.   The most common symptoms of high blood sugar include:  · Thirst.  · Frequent urination. · Weight loss. · Blurry vision. The symptoms of low blood sugar include:  · Sweating. · Shakiness. · Weakness. · Hunger. · Confusion. How can you prevent type 2 diabetes? The best way to prevent or delay type 2 diabetes is to adopt healthy habits, which include:  · Staying at a healthy weight. · Exercising regularly. · Eating healthy foods. How is type 2 diabetes treated? If you have type 2 diabetes, here are the most important things you can do. · Take your diabetes medicines. · Check your blood sugar as often as your doctor recommends. Also, get a hemoglobin A1c test at least every 6 months. · Try to eat a variety of foods and to spread carbohydrate throughout the day. Carbohydrate raises blood sugar higher and more quickly than any other nutrient does. Carbohydrate is found in sugar, breads and cereals, fruit, starchy vegetables such as potatoes and corn, and milk and yogurt. · Get at least 30 minutes of exercise on most days of the week. Walking is a good choice. You also may want to do other activities, such as running, swimming, cycling, or playing tennis or team sports. If your doctor says it's okay, do muscle-strengthening exercises at least 2 times a week. · See your doctor for checkups and tests on a regular schedule. · If you have high blood pressure or high cholesterol, take the medicines as prescribed by your doctor. · Do not smoke. Smoking can make health problems worse. This includes problems you might have with type 2 diabetes. If you need help quitting, talk to your doctor about stop-smoking programs and medicines. These can increase your chances of quitting for good. Follow-up care is a key part of your treatment and safety. Be sure to make and go to all appointments, and call your doctor if you are having problems.  It's also a good idea to know your test results and keep a list of the medicines you take. Where can you learn more? Go to http://angie-jimi.info/. Enter Z431 in the search box to learn more about \"Learning About Type 2 Diabetes. \"  Current as of: March 13, 2017  Content Version: 11.4  © 6305-3896 [employer]wise, [employer]. Care instructions adapted under license by [employer] (which disclaims liability or warranty for this information). If you have questions about a medical condition or this instruction, always ask your healthcare professional. [address] any warranty or liability for your use of this information.

## 2018-01-26 NOTE — PROGRESS NOTES
Vel Blake is a 38 y.o. male presents to office for htn, cholesterol, and dm      1.  Have you been to the ER, urgent care clinic or hospitalized since your last visit? no          Health Maintenance items with a due date reviewed with patient:  Health Maintenance Due   Topic Date Due    FOOT EXAM Q1  04/25/1983    EYE EXAM RETINAL OR DILATED Q1  04/25/1983    Pneumococcal 19-64 Medium Risk (1 of 1 - PPSV23) 04/25/1992    DTaP/Tdap/Td series (1 - Tdap) 04/25/1994    Influenza Age 9 to Adult  08/01/2017

## 2018-01-27 NOTE — PROGRESS NOTES
Luis Cheng is a 38 y.o.  male and presents with F/U for HTN, high chol and DM2. Chief Complaint   Patient presents with    Hypertension    Diabetes    Cholesterol Problem     Subjective: Additional Concerns: none    Patient Active Problem List    Diagnosis Date Noted    Obesity, morbid ([facility] [address] 01/26/2018    Type 2 diabetes mellitus with hyperglycemia, without long-term current use of insulin ([facility] [address] 12/29/2016    Fatty liver 06/03/2016    Hypertension     Dyslipidemia     Abnormal EKG     Sleep apnea      Current Outpatient Prescriptions   Medication Sig Dispense Refill    hydroCHLOROthiazide (HYDRODIURIL) 25 mg tablet Take 1 Tab by mouth daily. 90 Tab 1    ezetimibe (ZETIA) 10 mg tablet Take 1 Tab by mouth daily. Repeat liver and lipid labs needed one month  Indications: hyperlipidemia 90 Tab 1    SITagliptin-metFORMIN (JANUMET) 50-1,000 mg per tablet Take 1 Tab by mouth two (2) times daily (with meals). 180 Tab 2    metFORMIN (GLUCOPHAGE) 500 mg tablet Take 1 Tab by mouth daily (with breakfast). Indications: type 2 diabetes mellitus 90 Tab 1    atorvastatin (LIPITOR) 10 mg tablet Take 1 Tab by mouth daily. 90 Tab 1    lisinopril (PRINIVIL, ZESTRIL) 40 mg tablet Take 1 Tab by mouth daily. 90 Tab 1    atorvastatin (LIPITOR) 20 mg tablet Take 1 Tab by mouth daily. 90 Tab 2    glucose blood VI test strips (ONETOUCH VERIO) strip Pt to test blood sugar one time daily 200 Strip 1    lancets (ONETOUCH DELICA LANCETS) 30 gauge misc Pt to test blood sugar one time daily 200 Lancet 1    cpap machine kit by Does Not Apply route. Change BiPAP to 17/13 cm with humidifier. Mask: Simplus FF, medium size. Length of need 99 months. Replace mask and accessories as needed times 12 months. Download data at 30 days and fax at [phone]. Dx-Severe HANNA, Morbid obesity.  DME- [facility] 1 Kit 0     No Known Allergies  Past Medical History:   Diagnosis Date    Contact dermatitis     Dyslipidemia     Erectile dysfunction     Fatty liver 6/3/2016    Hypertension     Obesity     Sleep apnea     Type 2 diabetes mellitus with hyperglycemia, without long-term current use of insulin [facility] 12/29/2016     Past Surgical History:   Procedure Laterality Date    HX APPENDECTOMY      HX KNEE ARTHROSCOPY  2013    left knee     Family History   Problem Relation Age of Onset    Hypertension Mother     No Known Problems Father     Cancer Sister      breast    Hypertension Maternal Grandmother     No Known Problems Sister     No Known Problems Sister     No Known Problems Sister      Social History   Substance Use Topics    Smoking status: Former Smoker     Types: Cigarettes     Quit date: 9/1/2014    Smokeless tobacco: Never Used      Comment: quit cigarettes about 4 months ago:9/2014    Alcohol use No     ROS     General: negative for - chills, fatigue, fever, weight change  Psych: negative for - anxiety, depression, irritability or mood swings  ENT: negative for - headaches, hearing change, nasal congestion, oral lesions, sneezing or sore throat  Endo: negative for - hot flashes, polydipsia/polyuria or temperature intolerance  Resp: negative for - cough, shortness of breath or wheezing  CV: negative for - chest pain, edema or palpitations  MSK: negative for - joint pain, joint swelling or muscle pain  Neuro: negative for - confusion, headaches, seizures or weakness    Objective:  Vitals:    01/26/18 0831   BP: (!) 132/92   Pulse: (!) 103   Resp: 17   Temp: 96.8 °F (36 °C)   TempSrc: Oral   SpO2: 97%   Weight: 282 lb 9.6 oz (128.2 kg)   Height: 5' 7\" (1.702 m)     PE    Alert, well appearing, and in no distress, oriented to person, place, and time and overweight  General appearance - alert, well appearing, and in no distress, oriented to person, place, and time and overweight  Mental status - alert, oriented to person, place, and time, normal mood, behavior, speech, dress, motor activity, and thought processes  Lymphatics - no palpable lymphadenopathy  Chest - clear to auscultation, no wheezes, rales or rhonchi, symmetric air entry  Extremities - peripheral pulses normal, no pedal edema, no clubbing or cyanosis    LABS   Office Visit on 10/05/2017   Component Date Value Ref Range Status    Triglyceride 10/05/2017 181* 40 - 149 mg/dL Final    HDL Cholesterol 10/05/2017 33* 40 - 59 mg/dL Final    Cholesterol, total 10/05/2017 131  110 - 200 mg/dL Final    LDL, calculated 10/05/2017 62  50 - 99 mg/dL Final    VLDL, calculated 10/05/2017 36* 8 - 30 mg/dL Final    Comment: Test includes cholesterol, HDL cholesterol, triglycerides and LDL. Cholesterol Recommended NCEP guidelines in mg/dL:  Less than 200      Desirable  200 - 239          Borderline High  Greater than or  = to 240   High      Glucose 10/05/2017 87  70 - 99 mg/dL Final    BUN 10/05/2017 7  6 - 22 mg/dL Final    Creatinine 10/05/2017 0.6  0.5 - 1.2 mg/dL Final    Sodium 10/05/2017 142  133 - 145 mmol/L Final    Potassium 10/05/2017 4.5  3.5 - 5.5 mmol/L Final    Chloride 10/05/2017 97* 98 - 110 mmol/L Final    CO2 10/05/2017 29  20 - 32 mmol/L Final    AST (SGOT) 10/05/2017 35  10 - 37 U/L Final    ALT (SGPT) 10/05/2017 53* 5 - 40 U/L Final    Alk. phosphatase 10/05/2017 108  25 - 115 U/L Final    Bilirubin, total 10/05/2017 0.5  0.2 - 1.2 mg/dL Final    Calcium 10/05/2017 9.6  8.4 - 10.4 mg/dL Final    Protein, total 10/05/2017 6.8  6.4 - 8.3 g/dL Final    Albumin 10/05/2017 4.3  3.5 - 5.0 g/dL Final    A-G Ratio 10/05/2017 1.7  1.1 - 2.6 ratio Final    Globulin 10/05/2017 2.5  2.0 - 4.0 g/dL Final    Anion gap 10/05/2017 16.0  mmol/L Final    Comment: Test includes Albumin, Alkaline Phosphatase, ALT, AST, BUN, Calcium, CO2,  Chloride, Creatinine, Glucose, Potassium, Sodium, Total Bilirubin and Total  Protein. Estimated GFR results are reported in mL/min/1.73 sq.m. by the MDRD equation.   This eGFR is validated for stable chronic renal failure patients. This   equation  is unreliable in acute illness or patients with normal renal function.  GFRAA 10/05/2017 >60.0  >60.0 Final    GFRNA 10/05/2017 >60.0  >60.0 Final    Creatinine, urine 10/05/2017 225  mg/dL Final    Microalbumin, urine 10/05/2017 18.7* 0.1 - 17.0 ug/mL Final    Microalb/Creat ratio (ug/mg creat.) 10/05/2017 8.3  0.0 - 30.0 mcg/mg of Creatinine Final    Hemoglobin A1c 10/05/2017 6.2* 4.8 - 5.9 % Final    AVG GLU 10/05/2017 131* 91 - 123 mg/dL Final       TESTS  Results for orders placed or performed in visit on 10/05/17   LIPID PANEL   Result Value Ref Range    Triglyceride 181 (H) 40 - 149 mg/dL    HDL Cholesterol 33 (L) 40 - 59 mg/dL    Cholesterol, total 131 110 - 200 mg/dL    LDL, calculated 62 50 - 99 mg/dL    VLDL, calculated 36 (H) 8 - 30 mg/dL   METABOLIC PANEL, COMPREHENSIVE   Result Value Ref Range    Glucose 87 70 - 99 mg/dL    BUN 7 6 - 22 mg/dL    Creatinine 0.6 0.5 - 1.2 mg/dL    Sodium 142 133 - 145 mmol/L    Potassium 4.5 3.5 - 5.5 mmol/L    Chloride 97 (L) 98 - 110 mmol/L    CO2 29 20 - 32 mmol/L    AST (SGOT) 35 10 - 37 U/L    ALT (SGPT) 53 (H) 5 - 40 U/L    Alk. phosphatase 108 25 - 115 U/L    Bilirubin, total 0.5 0.2 - 1.2 mg/dL    Calcium 9.6 8.4 - 10.4 mg/dL    Protein, total 6.8 6.4 - 8.3 g/dL    Albumin 4.3 3.5 - 5.0 g/dL    A-G Ratio 1.7 1.1 - 2.6 ratio    Globulin 2.5 2.0 - 4.0 g/dL    Anion gap 16.0 mmol/L    GFRAA >60.0 >60.0    GFRNA >60.0 >60.0   MICROALBUMIN, UR, RAND W/ MICROALBUMIN/CREA RATIO   Result Value Ref Range    Creatinine, urine 225 mg/dL    Microalbumin, urine 18.7 (H) 0.1 - 17.0 ug/mL    Microalb/Creat ratio (ug/mg creat.) 8.3 0.0 - 30.0 mcg/mg of Creatinine   HEMOGLOBIN A1C W/O EAG   Result Value Ref Range    Hemoglobin A1c 6.2 (H) 4.8 - 5.9 %    AVG  (H) 91 - 123 mg/dL     Assessment/Plan:      1. Essential hypertension - uncontrolled  Explained to patient his BP should be less than 130/80 consistently.  Asked to monitor daily and if mostly  Above to f/u in 2-3 weeks. Otherwise routine 3 months. - METABOLIC PANEL, COMPREHENSIVE; Future  - METABOLIC PANEL, COMPREHENSIVE    2. High cholesterol  - LIPID PANEL; Future  - atorvastatin (LIPITOR) 10 mg tablet; Take 1 Tab by mouth daily. Dispense: 90 Tab; Refill: 1  - LIPID PANEL    3. Type 2 diabetes mellitus with hyperglycemia, without long-term current use of insulin (HCC)  - HEMOGLOBIN A1C WITH EAG; Future  - SITagliptin-metFORMIN (JANUMET) 50-1,000 mg per tablet; Take 1 Tab by mouth two (2) times daily (with meals). Dispense: 180 Tab; Refill: 2  - metFORMIN (GLUCOPHAGE) 500 mg tablet; Take 1 Tab by mouth daily (with breakfast). Indications: type 2 diabetes mellitus  Dispense: 90 Tab; Refill: 1  - HEMOGLOBIN A1C WITH EAG    Lab review: orders written for new lab studies as appropriate; see orders. I have discussed the diagnosis with the patient and the intended plan as seen in the above orders. The patient has received an after-visit summary and questions were answered concerning future plans. I have discussed medication side effects and warnings with the patient as well. I have reviewed the plan of care with the patient, accepted their input and they are in agreement with the treatment goals. Follow-up Disposition:  Return in about 3 months (around 4/26/2018), or if symptoms worsen or fail to improve.     Yumiko Guerrero MD

## 2018-01-31 ENCOUNTER — OFFICE VISIT (OUTPATIENT)
Dept: FAMILY MEDICINE CLINIC | Age: 43
End: 2018-01-31

## 2018-01-31 VITALS
HEIGHT: 67 IN | RESPIRATION RATE: 17 BRPM | HEART RATE: 83 BPM | WEIGHT: 289 LBS | DIASTOLIC BLOOD PRESSURE: 97 MMHG | BODY MASS INDEX: 45.36 KG/M2 | OXYGEN SATURATION: 97 % | SYSTOLIC BLOOD PRESSURE: 137 MMHG | TEMPERATURE: 96.7 F

## 2018-01-31 DIAGNOSIS — I10 ESSENTIAL HYPERTENSION: ICD-10-CM

## 2018-01-31 DIAGNOSIS — G47.33 OSA ON CPAP: ICD-10-CM

## 2018-01-31 DIAGNOSIS — Z99.89 OSA ON CPAP: ICD-10-CM

## 2018-01-31 DIAGNOSIS — R61 UNEXPLAINED NIGHT SWEATS: Primary | ICD-10-CM

## 2018-01-31 RX ORDER — AMLODIPINE BESYLATE 10 MG/1
TABLET ORAL
Qty: 30 TAB | Refills: 2 | Status: SHIPPED | OUTPATIENT
Start: 2018-01-31 | End: 2018-01-31 | Stop reason: SDUPTHER

## 2018-01-31 RX ORDER — AMLODIPINE BESYLATE 10 MG/1
TABLET ORAL
Qty: 90 TAB | Refills: 2 | Status: SHIPPED | OUTPATIENT
Start: 2018-01-31 | End: 2018-04-27 | Stop reason: SDUPTHER

## 2018-01-31 NOTE — PATIENT INSTRUCTIONS
DASH Diet: Care Instructions  Your Care Instructions    The DASH diet is an eating plan that can help lower your blood pressure. DASH stands for Dietary Approaches to Stop Hypertension. Hypertension is high blood pressure. The DASH diet focuses on eating foods that are high in calcium, potassium, and magnesium. These nutrients can lower blood pressure. The foods that are highest in these nutrients are fruits, vegetables, low-fat dairy products, nuts, seeds, and legumes. But taking calcium, potassium, and magnesium supplements instead of eating foods that are high in those nutrients does not have the same effect. The DASH diet also includes whole grains, fish, and poultry. The DASH diet is one of several lifestyle changes your doctor may recommend to lower your high blood pressure. Your doctor may also want you to decrease the amount of sodium in your diet. Lowering sodium while following the DASH diet can lower blood pressure even further than just the DASH diet alone. Follow-up care is a key part of your treatment and safety. Be sure to make and go to all appointments, and call your doctor if you are having problems. It's also a good idea to know your test results and keep a list of the medicines you take. How can you care for yourself at home? Following the DASH diet  · Eat 4 to 5 servings of fruit each day. A serving is 1 medium-sized piece of fruit, ½ cup chopped or canned fruit, 1/4 cup dried fruit, or 4 ounces (½ cup) of fruit juice. Choose fruit more often than fruit juice. · Eat 4 to 5 servings of vegetables each day. A serving is 1 cup of lettuce or raw leafy vegetables, ½ cup of chopped or cooked vegetables, or 4 ounces (½ cup) of vegetable juice. Choose vegetables more often than vegetable juice. · Get 2 to 3 servings of low-fat and fat-free dairy each day. A serving is 8 ounces of milk, 1 cup of yogurt, or 1 ½ ounces of cheese. · Eat 6 to 8 servings of grains each day.  A serving is 1 slice of bread, 1 ounce of dry cereal, or ½ cup of cooked rice, pasta, or cooked cereal. Try to choose whole-grain products as much as possible. · Limit lean meat, poultry, and fish to 2 servings each day. A serving is 3 ounces, about the size of a deck of cards. · Eat 4 to 5 servings of nuts, seeds, and legumes (cooked dried beans, lentils, and split peas) each week. A serving is 1/3 cup of nuts, 2 tablespoons of seeds, or ½ cup of cooked beans or peas. · Limit fats and oils to 2 to 3 servings each day. A serving is 1 teaspoon of vegetable oil or 2 tablespoons of salad dressing. · Limit sweets and added sugars to 5 servings or less a week. A serving is 1 tablespoon jelly or jam, ½ cup sorbet, or 1 cup of lemonade. · Eat less than 2,300 milligrams (mg) of sodium a day. If you limit your sodium to 1,500 mg a day, you can lower your blood pressure even more. Tips for success  · Start small. Do not try to make dramatic changes to your diet all at once. You might feel that you are missing out on your favorite foods and then be more likely to not follow the plan. Make small changes, and stick with them. Once those changes become habit, add a few more changes. · Try some of the following:  ¨ Make it a goal to eat a fruit or vegetable at every meal and at snacks. This will make it easy to get the recommended amount of fruits and vegetables each day. ¨ Try yogurt topped with fruit and nuts for a snack or healthy dessert. ¨ Add lettuce, tomato, cucumber, and onion to sandwiches. ¨ Combine a ready-made pizza crust with low-fat mozzarella cheese and lots of vegetable toppings. Try using tomatoes, squash, spinach, broccoli, carrots, cauliflower, and onions. ¨ Have a variety of cut-up vegetables with a low-fat dip as an appetizer instead of chips and dip. ¨ Sprinkle sunflower seeds or chopped almonds over salads. Or try adding chopped walnuts or almonds to cooked vegetables.   ¨ Try some vegetarian meals using beans and peas. Add garbanzo or kidney beans to salads. Make burritos and tacos with mashed cote beans or black beans. Where can you learn more? Go to http://angie-jimi.info/. Enter R251 in the search box to learn more about \"DASH Diet: Care Instructions. \"  Current as of: September 21, 2016  Content Version: 11.4  © 8300-6605 [employer]. Care instructions adapted under license by [employer] (which disclaims liability or warranty for this information). If you have questions about a medical condition or this instruction, always ask your healthcare professional. [address] any warranty or liability for your use of this information. Low Sodium Diet (2,000 Milligram): Care Instructions  Your Care Instructions    Too much sodium causes your body to hold on to extra water. This can raise your blood pressure and force your heart and kidneys to work harder. In very serious cases, this could cause you to be put in the hospital. It might even be life-threatening. By limiting sodium, you will feel better and lower your risk of serious problems. The most common source of sodium is salt. People get most of the salt in their diet from canned, prepared, and packaged foods. Fast food and restaurant meals also are very high in sodium. Your doctor will probably limit your sodium to less than 2,000 milligrams (mg) a day. This limit counts all the sodium in prepared and packaged foods and any salt you add to your food. Follow-up care is a key part of your treatment and safety. Be sure to make and go to all appointments, and call your doctor if you are having problems. It's also a good idea to know your test results and keep a list of the medicines you take. How can you care for yourself at home? Read food labels  · Read labels on cans and food packages. The labels tell you how much sodium is in each serving. Make sure that you look at the serving size.  If you eat more than the serving size, you have eaten more sodium. · Food labels also tell you the Percent Daily Value for sodium. Choose products with low Percent Daily Values for sodium. · Be aware that sodium can come in forms other than salt, including monosodium glutamate (MSG), sodium citrate, and sodium bicarbonate (baking soda). MSG is often added to Asian food. When you eat out, you can sometimes ask for food without MSG or added salt. Buy low-sodium foods  · Buy foods that are labeled \"unsalted\" (no salt added), \"sodium-free\" (less than 5 mg of sodium per serving), or \"low-sodium\" (less than 140 mg of sodium per serving). Foods labeled \"reduced-sodium\" and \"light sodium\" may still have too much sodium. Be sure to read the label to see how much sodium you are getting. · Buy fresh vegetables, or frozen vegetables without added sauces. Buy low-sodium versions of canned vegetables, soups, and other canned goods. Prepare low-sodium meals  · Cut back on the amount of salt you use in cooking. This will help you adjust to the taste. Do not add salt after cooking. One teaspoon of salt has about 2,300 mg of sodium. · Take the salt shaker off the table. · Flavor your food with garlic, lemon juice, onion, vinegar, herbs, and spices. Do not use soy sauce, lite soy sauce, steak sauce, onion salt, garlic salt, celery salt, mustard, or ketchup on your food. · Use low-sodium salad dressings, sauces, and ketchup. Or make your own salad dressings and sauces without adding salt. · Use less salt (or none) when recipes call for it. You can often use half the salt a recipe calls for without losing flavor. Other foods such as rice, pasta, and grains do not need added salt. · Rinse canned vegetables, and cook them in fresh water. This removes some-but not all-of the salt. · Avoid water that is naturally high in sodium or that has been treated with water softeners, which add sodium.  Call your local water company to find out the sodium content of your water supply. If you buy bottled water, read the label and choose a sodium-free brand. Avoid high-sodium foods  · Avoid eating:  ¨ Smoked, cured, salted, and canned meat, fish, and poultry. ¨ Ham, simeon, hot dogs, and luncheon meats. ¨ Regular, hard, and processed cheese and regular peanut butter. ¨ Crackers with salted tops, and other salted snack foods such as pretzels, chips, and salted popcorn. ¨ Frozen prepared meals, unless labeled low-sodium. ¨ Canned and dried soups, broths, and bouillon, unless labeled sodium-free or low-sodium. ¨ Canned vegetables, unless labeled sodium-free or low-sodium. ¨ [place] fries, pizza, tacos, and other fast foods. ¨ Pickles, olives, ketchup, and other condiments, especially soy sauce, unless labeled sodium-free or low-sodium. Where can you learn more? Go to http://angie-jimi.info/. Enter M089 in the search box to learn more about \"Low Sodium Diet (2,000 Milligram): Care Instructions. \"  Current as of: May 12, 2017  Content Version: 11.4  © 6123-9908 [email or web address]. Care instructions adapted under license by [employer] (which disclaims liability or warranty for this information). If you have questions about a medical condition or this instruction, always ask your healthcare professional. [place] [address] any warranty or liability for your use of this information. Panic Attacks: Care Instructions  Your Care Instructions    During a panic attack, you may have a feeling of intense fear or terror, trouble breathing, chest pain or tightness, heartbeat changes, dizziness, sweating, and shaking. A panic attack starts suddenly and usually lasts from 5 to 20 minutes but may last even longer. You have the most anxiety about 10 minutes after the attack starts. An attack can begin with a stressful event, or it can happen without a cause.   Although panic attacks can cause scary symptoms, you can learn to manage them with self-care, counseling, and medicine. Follow-up care is a key part of your treatment and safety. Be sure to make and go to all appointments, and call your doctor if you are having problems. It's also a good idea to know your test results and keep a list of the medicines you take. How can you care for yourself at home? · Take your medicine exactly as directed. Call your doctor if you think you are having a problem with your medicine. · Go to your counseling sessions and follow-up appointments. · Recognize and accept your anxiety. Then, when you are in a situation that makes you anxious, say to yourself, \"This is not an emergency. I feel uncomfortable, but I am not in danger. I can keep going even if I feel anxious. \"  · Be kind to your body:  ¨ Relieve tension with exercise or a massage. ¨ Get enough rest.  ¨ Avoid alcohol, caffeine, nicotine, and illegal drugs. They can increase your anxiety level, cause sleep problems, or trigger a panic attack. ¨ Learn and do relaxation techniques. See below for more about these techniques. · Engage your mind. Get out and do something you enjoy. Go to a funny movie, or take a walk or hike. Plan your day. Having too much or too little to do can make you anxious. · Keep a record of your symptoms. Discuss your fears with a good friend or family member, or join a support group for people with similar problems. Talking to others sometimes relieves stress. · Get involved in social groups, or volunteer to help others. Being alone sometimes makes things seem worse than they are. · Get at least 30 minutes of exercise on most days of the week to relieve stress. Walking is a good choice. You also may want to do other activities, such as running, swimming, cycling, or playing tennis or team sports. Relaxation techniques  Do relaxation exercises for 10 to 20 minutes a day. You can play soothing, relaxing music while you do them, if you wish.   · Tell others in your house that you are going to do your relaxation exercises. Ask them not to disturb you. · Find a comfortable place, away from all distractions and noise. · Lie down on your back, or sit with your back straight. · Focus on your breathing. Make it slow and steady. · Breathe in through your nose. Breathe out through either your nose or mouth. · Breathe deeply, filling up the area between your navel and your rib cage. Breathe so that your belly goes up and down. · Do not hold your breath. · Breathe like this for 5 to 10 minutes. Notice the feeling of calmness throughout your whole body. As you continue to breathe slowly and deeply, relax by doing the following for another 5 to 10 minutes:  · Tighten and relax each muscle group in your body. You can begin at your toes and work your way up to your head. · Imagine your muscle groups relaxing and becoming heavy. · Empty your mind of all thoughts. · Let yourself relax more and more deeply. · Become aware of the state of calmness that surrounds you. · When your relaxation time is over, you can bring yourself back to alertness by moving your fingers and toes and then your hands and feet and then stretching and moving your entire body. Sometimes people fall asleep during relaxation, but they usually wake up shortly afterward. · Always give yourself time to return to full alertness before you drive a car or do anything that might cause an accident if you are not fully alert. Never play a relaxation tape while driving a car. When should you call for help? Call 911 anytime you think you may need emergency care. For example, call if:  ? · You feel you cannot stop from hurting yourself or someone else. ? Watch closely for changes in your health, and be sure to contact your doctor if:  ? · Your panic attacks get worse. ? · You have new or different anxiety. ? · You are not getting better as expected. Where can you learn more?   Go to http://angie-jimi.info/. Enter H601 in the search box to learn more about \"Panic Attacks: Care Instructions. \"  Current as of: May 12, 2017  Content Version: 11.4  © 2518-6398 [employer], [employer]. Care instructions adapted under license by [employer] (which disclaims liability or warranty for this information). If you have questions about a medical condition or this instruction, always ask your healthcare professional. [place] [address] any warranty or liability for your use of this information.

## 2018-01-31 NOTE — MR AVS SNAPSHOT
[address] [facility] [address] 
 
 
 120 [facility] [address] [place] [address] 
[phone] Patient: Armando Gilliam MRN: [id number] SJT:[id number] Visit Information Frances Briceño Personal Médico Departamento Teléfono del Dep. Número de visita 1/31/2018  9:00 AM Ozzy Hickey MD [facility] OSHKO [phone] [id number] Follow-up Instructions Return in about 3 months (around 4/30/2018), or if symptoms worsen or fail to improve. Follow-up and Disposition History Your Appointments 4/27/2018  9:00 AM  
Follow Up with Ozzy Hickey MD  
[facility] OSH[facility]OSH [address] [place] [address]) Appt Note: 3 mon f/u  
 [address] [address]  
[phone]  
  
   
 [address] [place], [place] [id number] Upcoming Health Maintenance Date Due  
 FOOT EXAM Q1 4/25/1983 EYE EXAM RETINAL OR DILATED Q1 4/25/1983 Pneumococcal 19-64 Medium Risk (1 of 1 - PPSV23) 4/25/1992 DTaP/Tdap/Td series (1 - Tdap) 4/25/1994 Influenza Age 5 to Adult 8/1/2017 HEMOGLOBIN A1C Q6M 7/31/2018 MICROALBUMIN Q1 10/5/2018 LIPID PANEL Q1 1/31/2019 Alergias  Review Complete El: 5/17/2017 Por: Mary Doss LPN A partir del:  1/31/2018 No Known Allergies Vacunas actuales Leeroy Blaze No hay ninguna vacuna archivada. No revisadas esta visita You Were Diagnosed With   
  
 Thais Medina Unexplained night sweats    -  Primary ICD-10-CM: R61 
ICD-9-CM: 780.8 HANNA on CPAP     ICD-10-CM: G47.33, Z99.89 ICD-9-CM: 327.23, V46.8 Essential hypertension     ICD-10-CM: I10 
ICD-9-CM: 401.9 Partes vitales PS Pulso Temperatura Resp [place] ( percentil de crecimiento) Peso (percentil de crecimiento) (!) 137/97 83 96.7 °F (35.9 °C) (Oral) 17 5' 7\" (1.702 m) 289 lb (131.1 kg) SpO2 BMI ([facility]) Estatus de tabaquísmo 97% 45.26 kg/m2 Former Smoker BMI and BSA Data Body Mass Index Body Surface Area  
 45.26 kg/m 2 2.49 m 2 Pe[facility] Name Phone [address], [facility] [address] [phone] Santos lista de medicamentos actualizada Iliana Elliott actualizada 1/31/18 11:59 PM.  Ishaan Apt use santos lista de medicamentos más reciente. amLODIPine 10 mg tablet También conocido bernabe:  Matt Nearing TAKE 1 TABLET BY MOUTH DAILY FOR HIGH BLOOD PRESSURE  
  
 * atorvastatin 20 mg tablet También conocido bernabe:  LIPITOR Take 1 Tab by mouth daily. * atorvastatin 10 mg tablet También conocido bernabe:  LIPITOR Take 1 Tab by mouth daily. cpap machine kit  
by Does Not Apply route. Change BiPAP to 17/13 cm with humidifier. Mask: Simplus FF, medium size. Length of need 99 months. Replace mask and accessories as needed times 12 months. Download data at 30 days and fax at [phone]. Dx-Severe HANNA, Morbid obesity. DME- [facility]  
  
 ezetimibe 10 mg tablet También conocido bernabe:  Armando Pradhan Take 1 Tab by mouth daily. Repeat liver and lipid labs needed one month  Indications: hyperlipidemia  
  
 glucose blood VI test strips strip También conocido bernabe:  Emerald Ano Pt to test blood sugar one time daily  
  
 hydroCHLOROthiazide 25 mg tablet También conocido bernabe:  HYDRODIURIL Take 1 Tab by mouth daily. lancets 30 gauge Misc También conocido bernabe:  Edith Garre LANCETS Pt to test blood sugar one time daily  
  
 lisinopril 40 mg tablet También conocido bernabe:  Hilary [place] Take 1 Tab by mouth daily. metFORMIN 500 mg tablet También conocido bernabe:  GLUCOPHAGE Take 1 Tab by mouth daily (with breakfast). Indications: type 2 diabetes mellitus SITagliptin-metFORMIN 50-1,000 mg per tablet También conocido bernabe:  Jamila Oviedo Take 1 Tab by mouth two (2) times daily (with meals). * Aviso:  Esta lista contiene medicamentos que son iguales a otros medicamentos recetados para usted. Niurka las instrucciones con cuidado y pida a crump personal médico que revise la lista de medicamentos y las instrucciones correspondientes con usted. Recetas Enviado a la [address] Refills  
 amLODIPine (NORVASC) 10 mg tablet (Discontinued) 2 Sig: Take one tab po daily  Indications: hypertension Class: Normal  
 Pharmacy: [facility] [id number] [employer] [address]  #: [phone] Reason for Discontinue: Reorder Hicimos lo siguiente CBC WITH AUTOMATED DIFF [62668 CPT(R)] HEMOGLOBIN A1C W/O EAG [62570 CPT(R)] REFERRAL TO PULMONARY DISEASE [HUS Custom] Comentarios:  
 Please evaluate patient for HANNA on CPAP  
 TSH 3RD GENERATION [27708 CPT(R)] Instrucciones de seguimiento Return in about 3 months (around 4/30/2018), or if symptoms worsen or fail to improve. Por hacer 01/31/2018 Lab:  CBC WITH AUTOMATED DIFF   
  
 01/31/2018 Lab:  TSH 3RD GENERATION Informacion de [employer] Codigo de Referencia Referido por Referido a  
  
 [id number] [facility] [place], Alfredito Odell [address]   
   [place], [address] Phone: [phone] Fax: [phone] Visitas Estado Fecha de inicio Kadi Pole final  
 1 Authorized 1/31/18 1/31/19 Si crump referencia tiene un estado de \"pending review\" o \"denied\" , informacion adicional sera enviada para apoyar el resultado de esta decision. Instrucciones para el Paciente DASH Diet: Care Instructions Your Care Instructions The DASH diet is an eating plan that can help lower your blood pressure. DASH stands for Dietary Approaches to Stop Hypertension. Hypertension is high blood pressure. The DASH diet focuses on eating foods that are high in calcium, potassium, and magnesium. These nutrients can lower blood pressure. The foods that are highest in these nutrients are fruits, vegetables, low-fat dairy products, nuts, seeds, and legumes. But taking calcium, potassium, and magnesium supplements instead of eating foods that are high in those nutrients does not have the same effect. The DASH diet also includes whole grains, fish, and poultry. The DASH diet is one of several lifestyle changes your doctor may recommend to lower your high blood pressure. Your doctor may also want you to decrease the amount of sodium in your diet. Lowering sodium while following the DASH diet can lower blood pressure even further than just the DASH diet alone. Follow-up care is a key part of your treatment and safety. Be sure to make and go to all appointments, and call your doctor if you are having problems. It's also a good idea to know your test results and keep a list of the medicines you take. How can you care for yourself at home? Following the DASH diet · Eat 4 to 5 servings of fruit each day. A serving is 1 medium-sized piece of fruit, ½ cup chopped or canned fruit, 1/4 cup dried fruit, or 4 ounces (½ cup) of fruit juice. Choose fruit more often than fruit juice. · Eat 4 to 5 servings of vegetables each day. A serving is 1 cup of lettuce or raw leafy vegetables, ½ cup of chopped or cooked vegetables, or 4 ounces (½ cup) of vegetable juice. Choose vegetables more often than vegetable juice. · Get 2 to 3 servings of low-fat and fat-free dairy each day. A serving is 8 ounces of milk, 1 cup of yogurt, or 1 ½ ounces of cheese. · Eat 6 to 8 servings of grains each day. A serving is 1 slice of bread, 1 ounce of dry cereal, or ½ cup of cooked rice, pasta, or cooked cereal. Try to choose whole-grain products as much as possible. · Limit lean meat, poultry, and fish to 2 servings each day.  A serving is 3 ounces, about the size of a deck of cards. · Eat 4 to 5 servings of nuts, seeds, and legumes (cooked dried beans, lentils, and split peas) each week. A serving is 1/3 cup of nuts, 2 tablespoons of seeds, or ½ cup of cooked beans or peas. · Limit fats and oils to 2 to 3 servings each day. A serving is 1 teaspoon of vegetable oil or 2 tablespoons of salad dressing. · Limit sweets and added sugars to 5 servings or less a week. A serving is 1 tablespoon jelly or jam, ½ cup sorbet, or 1 cup of lemonade. · Eat less than 2,300 milligrams (mg) of sodium a day. If you limit your sodium to 1,500 mg a day, you can lower your blood pressure even more. Tips for success · Start small. Do not try to make dramatic changes to your diet all at once. You might feel that you are missing out on your favorite foods and then be more likely to not follow the plan. Make small changes, and stick with them. Once those changes become habit, add a few more changes. · Try some of the following: ¨ Make it a goal to eat a fruit or vegetable at every meal and at snacks. This will make it easy to get the recommended amount of fruits and vegetables each day. ¨ Try yogurt topped with fruit and nuts for a snack or healthy dessert. ¨ Add lettuce, tomato, cucumber, and onion to sandwiches. ¨ Combine a ready-made pizza crust with low-fat mozzarella cheese and lots of vegetable toppings. Try using tomatoes, squash, spinach, broccoli, carrots, cauliflower, and onions. ¨ Have a variety of cut-up vegetables with a low-fat dip as an appetizer instead of chips and dip. ¨ Sprinkle sunflower seeds or chopped almonds over salads. Or try adding chopped walnuts or almonds to cooked vegetables. ¨ Try some vegetarian meals using beans and peas. Add garbanzo or kidney beans to salads. Make burritos and tacos with mashed cote beans or black beans. Where can you learn more? Go to http://adams-jimi.info/. Enter C137 in the search box to learn more about \"DASH Diet: Care Instructions. \" Current as of: September 21, 2016 Content Version: 11.4 © 9721-6364 Application Experts. Care instructions adapted under license by StartWire (which disclaims liability or warranty for this information). If you have questions about a medical condition or this instruction, always ask your healthcare professional. [address] any warranty or liability for your use of this information. Low Sodium Diet (2,000 Milligram): Care Instructions Your Care Instructions Too much sodium causes your body to hold on to extra water. This can raise your blood pressure and force your heart and kidneys to work harder. In very serious cases, this could cause you to be put in the hospital. It might even be life-threatening. By limiting sodium, you will feel better and lower your risk of serious problems. The most common source of sodium is salt. People get most of the salt in their diet from canned, prepared, and packaged foods. Fast food and restaurant meals also are very high in sodium. Your doctor will probably limit your sodium to less than 2,000 milligrams (mg) a day. This limit counts all the sodium in prepared and packaged foods and any salt you add to your food. Follow-up care is a key part of your treatment and safety. Be sure to make and go to all appointments, and call your doctor if you are having problems. It's also a good idea to know your test results and keep a list of the medicines you take. How can you care for yourself at home? Read food labels · Read labels on cans and food packages. The labels tell you how much sodium is in each serving. Make sure that you look at the serving size. If you eat more than the serving size, you have eaten more sodium. · Food labels also tell you the Percent Daily Value for sodium. Choose products with low Percent Daily Values for sodium. · Be aware that sodium can come in forms other than salt, including monosodium glutamate (MSG), sodium citrate, and sodium bicarbonate (baking soda). MSG is often added to Asian food. When you eat out, you can sometimes ask for food without MSG or added salt. Buy low-sodium foods · Buy foods that are labeled \"unsalted\" (no salt added), \"sodium-free\" (less than 5 mg of sodium per serving), or \"low-sodium\" (less than 140 mg of sodium per serving). Foods labeled \"reduced-sodium\" and \"light sodium\" may still have too much sodium. Be sure to read the label to see how much sodium you are getting. · Buy fresh vegetables, or frozen vegetables without added sauces. Buy low-sodium versions of canned vegetables, soups, and other canned goods. Prepare low-sodium meals · Cut back on the amount of salt you use in cooking. This will help you adjust to the taste. Do not add salt after cooking. One teaspoon of salt has about 2,300 mg of sodium. · Take the salt shaker off the table. · Flavor your food with garlic, lemon juice, onion, vinegar, herbs, and spices. Do not use soy sauce, lite soy sauce, steak sauce, onion salt, garlic salt, celery salt, mustard, or ketchup on your food. · Use low-sodium salad dressings, sauces, and ketchup. Or make your own salad dressings and sauces without adding salt. · Use less salt (or none) when recipes call for it. You can often use half the salt a recipe calls for without losing flavor. Other foods such as rice, pasta, and grains do not need added salt. · Rinse canned vegetables, and cook them in fresh water. This removes some-but not all-of the salt. · Avoid water that is naturally high in sodium or that has been treated with water softeners, which add sodium. Call your local water company to find out the sodium content of your water supply. If you buy bottled water, read the label and choose a sodium-free brand. Avoid high-sodium foods · Avoid eating: ¨ Smoked, cured, salted, and canned meat, fish, and poultry. ¨ Ham, simeon, hot dogs, and luncheon meats. ¨ Regular, hard, and processed cheese and regular peanut butter. ¨ Crackers with salted tops, and other salted snack foods such as pretzels, chips, and salted popcorn. ¨ Frozen prepared meals, unless labeled low-sodium. ¨ Canned and dried soups, broths, and bouillon, unless labeled sodium-free or low-sodium. ¨ Canned vegetables, unless labeled sodium-free or low-sodium. ¨ Western Joceline fries, pizza, tacos, and other fast foods. ¨ Pickles, olives, ketchup, and other condiments, especially soy sauce, unless labeled sodium-free or low-sodium. Where can you learn more? Go to http://angie-jimi.info/. Enter X711 in the search box to learn more about \"Low Sodium Diet (2,000 Milligram): Care Instructions. \" Current as of: May 12, 2017 Content Version: 11.4 © 1117-4867 Secant Therapeutics. Care instructions adapted under license by Rebtel (which disclaims liability or warranty for this information). If you have questions about a medical condition or this instruction, always ask your healthcare professional. [place] [address] any warranty or liability for your use of this information. Panic Attacks: Care Instructions Your Care Instructions During a panic attack, you may have a feeling of intense fear or terror, trouble breathing, chest pain or tightness, heartbeat changes, dizziness, sweating, and shaking. A panic attack starts suddenly and usually lasts from 5 to 20 minutes but may last even longer. You have the most anxiety about 10 minutes after the attack starts. An attack can begin with a stressful event, or it can happen without a cause. Although panic attacks can cause scary symptoms, you can learn to manage them with self-care, counseling, and medicine. Follow-up care is a key part of your treatment and safety.  Be sure to make and go to all appointments, and call your doctor if you are having problems. It's also a good idea to know your test results and keep a list of the medicines you take. How can you care for yourself at home? · Take your medicine exactly as directed. Call your doctor if you think you are having a problem with your medicine. · Go to your counseling sessions and follow-up appointments. · Recognize and accept your anxiety. Then, when you are in a situation that makes you anxious, say to yourself, \"This is not an emergency. I feel uncomfortable, but I am not in danger. I can keep going even if I feel anxious. \" · Be kind to your body: ¨ Relieve tension with exercise or a massage. ¨ Get enough rest. 
¨ Avoid alcohol, caffeine, nicotine, and illegal drugs. They can increase your anxiety level, cause sleep problems, or trigger a panic attack. ¨ Learn and do relaxation techniques. See below for more about these techniques. · Engage your mind. Get out and do something you enjoy. Go to a funny movie, or take a walk or hike. Plan your day. Having too much or too little to do can make you anxious. · Keep a record of your symptoms. Discuss your fears with a good friend or family member, or join a support group for people with similar problems. Talking to others sometimes relieves stress. · Get involved in social groups, or volunteer to help others. Being alone sometimes makes things seem worse than they are. · Get at least 30 minutes of exercise on most days of the week to relieve stress. Walking is a good choice. You also may want to do other activities, such as running, swimming, cycling, or playing tennis or team sports. Relaxation techniques Do relaxation exercises for 10 to 20 minutes a day. You can play soothing, relaxing music while you do them, if you wish. · Tell others in your house that you are going to do your relaxation exercises. Ask them not to disturb you. · Find a comfortable place, away from all distractions and noise. · Lie down on your back, or sit with your back straight. · Focus on your breathing. Make it slow and steady. · Breathe in through your nose. Breathe out through either your nose or mouth. · Breathe deeply, filling up the area between your navel and your rib cage. Breathe so that your belly goes up and down. · Do not hold your breath. · Breathe like this for 5 to 10 minutes. Notice the feeling of calmness throughout your whole body. As you continue to breathe slowly and deeply, relax by doing the following for another 5 to 10 minutes: · Tighten and relax each muscle group in your body. You can begin at your toes and work your way up to your head. · Imagine your muscle groups relaxing and becoming heavy. · Empty your mind of all thoughts. · Let yourself relax more and more deeply. · Become aware of the state of calmness that surrounds you. · When your relaxation time is over, you can bring yourself back to alertness by moving your fingers and toes and then your hands and feet and then stretching and moving your entire body. Sometimes people fall asleep during relaxation, but they usually wake up shortly afterward. · Always give yourself time to return to full alertness before you drive a car or do anything that might cause an accident if you are not fully alert. Never play a relaxation tape while driving a car. When should you call for help? Call 911 anytime you think you may need emergency care. For example, call if: 
? · You feel you cannot stop from hurting yourself or someone else. ? Watch closely for changes in your health, and be sure to contact your doctor if: 
? · Your panic attacks get worse. ? · You have new or different anxiety. ? · You are not getting better as expected. Where can you learn more? Go to http://angie-jimi.info/.  
Enter H601 in the search box to learn more about \"Panic Attacks: Care Instructions. \" Current as of: May 12, 2017 Content Version: 11.4 © 6575-8689 Slidely. Care instructions adapted under license by Forever (which disclaims liability or warranty for this information). If you have questions about a medical condition or this instruction, always ask your healthcare professional. [address] any warranty or liability for your use of this information. Patient Instructions History Introducing \[facility] [place] [facility]\"" & HEALTH SERVICES! [facility] introduce portal paciente MyChart . Ahora se puede acceder a partes de rcump expediente médico, enviar por correo electrónico la oficina de crump médico y solicitar renovaciones de medicamentos en línea. En crump navegador de Internet , Alexandria Damico a [email or web address] Mariana clic en el usuario por Sunita Sher? Deacon Revel clic aquí en la sesión [facility]. Verá la página de registro Martin. Ingrese crump código de [employer] mariano y bernabe aparece a continuación. Usted no tendrá que [employer] código después de dariana completado el proceso de registro . Si usted no se inscribe antes de la fecha de caducidad , debe solicitar un nuevo código. · MyChart Código de acceso : [id number] Expires: 5/30/2018 12:28 PM 
 
Ingresa los últimos cuatro dígitos de crump Número de Seguro Social ( xxxx ) y fecha de nacimiento ( dd / mm / aaaa ) bernabe se indica y mariana clic en Enviar. Usted será llevado a la siguiente página de registro . Crear un ID MyChart . Esta será crump ID de inicio de sesión de MyChart y no puede ser [place] , por lo que pensar en renny que es General Ko y fácil de recordar . Crear renny contraseña MyChart . Usted puede cambiar crump contraseña en cualquier momento . Ingrese crump Password Reset de preguntas y [place] . Numa se puede utilizar en un momento posterior si usted olvida crump contraseña.  
Introduzca crump dirección de correo electrónico . Mehran Peres recibirá Carolynn Adkins notificación por correo electrónico cuando la nueva información está disponible en MyChart . Charyl Pitkin clic en Registrarse. Orlene Candelario barrera y descargar porciones de crump expediente médico. 
Hansel clic en el enlace de descarga del menú Resumen para descargar renny copia portátil de crump información médica . Si tiene [employer] , por favor visite la sección de preguntas frecuentes del sitio web MyCSentreHEART . Recuerde, [employer] NO es que se utilizará para las necesidades urgentes. Para emergencias médicas , llame al 911 . Ahora disponible en crump iPhone y Android ! Por favor proporcione effie resumen de la documentación de cuidado a crump próximo proveedor. Your primary care clinician is listed as [address]. If you have any questions after today's visit, please call [phone].

## 2018-01-31 NOTE — PROGRESS NOTES
Mandeep Best is a 38 y.o.  male and presents with need to change Pulmonologist for HANNA on CPAP. He also suddenly c/o of night sweats with panic attacks without any particular reason or trigger. Subjective: Additional Concerns: none     Patient Active Problem List    Diagnosis Date Noted    Obesity, morbid ([facility] 01/26/2018    Type 2 diabetes mellitus with hyperglycemia, without long-term current use of insulin ([facility] 12/29/2016    Fatty liver 06/03/2016    Hypertension     Dyslipidemia     Abnormal EKG     Sleep apnea      Current Outpatient Prescriptions   Medication Sig Dispense Refill    hydroCHLOROthiazide (HYDRODIURIL) 25 mg tablet Take 1 Tab by mouth daily. 90 Tab 1    ezetimibe (ZETIA) 10 mg tablet Take 1 Tab by mouth daily. Repeat liver and lipid labs needed one month  Indications: hyperlipidemia 90 Tab 1    SITagliptin-metFORMIN (JANUMET) 50-1,000 mg per tablet Take 1 Tab by mouth two (2) times daily (with meals). 180 Tab 2    metFORMIN (GLUCOPHAGE) 500 mg tablet Take 1 Tab by mouth daily (with breakfast). Indications: type 2 diabetes mellitus 90 Tab 1    atorvastatin (LIPITOR) 10 mg tablet Take 1 Tab by mouth daily. 90 Tab 1    lisinopril (PRINIVIL, ZESTRIL) 40 mg tablet Take 1 Tab by mouth daily. 90 Tab 1    atorvastatin (LIPITOR) 20 mg tablet Take 1 Tab by mouth daily. 90 Tab 2    glucose blood VI test strips (ONETOUCH VERIO) strip Pt to test blood sugar one time daily 200 Strip 1    lancets (ONETOUCH DELICA LANCETS) 30 gauge misc Pt to test blood sugar one time daily 200 Lancet 1    cpap machine kit by Does Not Apply route. Change BiPAP to 17/13 cm with humidifier. Mask: Simplus FF, medium size. Length of need 99 months. Replace mask and accessories as needed times 12 months. Download data at 30 days and fax at [phone]. Dx-Severe HANNA, Morbid obesity.  DME- [facility] 1 Kit 0     No Known Allergies  Past Medical History:   Diagnosis Date    Contact dermatitis     Dyslipidemia     Erectile dysfunction     Fatty liver 6/3/2016    Hypertension     Obesity     Sleep apnea     Type 2 diabetes mellitus with hyperglycemia, without long-term current use of insulin ([facility] 12/29/2016     Past Surgical History:   Procedure Laterality Date    HX APPENDECTOMY      HX KNEE ARTHROSCOPY  2013    left knee     Family History   Problem Relation Age of Onset    Hypertension Mother     No Known Problems Father     Cancer Sister      breast    Hypertension Maternal Grandmother     No Known Problems Sister     No Known Problems Sister     No Known Problems Sister      Social History   Substance Use Topics    Smoking status: Former Smoker     Types: Cigarettes     Quit date: 9/1/2014    Smokeless tobacco: Never Used      Comment: quit cigarettes about 4 months ago:9/2014    Alcohol use No     ROS     General: negative for - chills, fatigue, fever, weight change, positive for night sweats  Psych: negative for - anxiety, depression, irritability or mood swings, positive for panic attacks  Endo: negative for - hot flashes, polydipsia/polyuria or temperature intolerance  Resp: negative for - cough, shortness of breath or wheezing  CV: negative for - chest pain, edema or palpitations    Objective:    PE    Alert, well appearing, and in no distress, oriented to person, place, and time and overweight  General appearance - alert, well appearing, and in no distress, oriented to person, place, and time and overweight  Mental status - alert, oriented to person, place, and time, normal mood, behavior, speech, dress, motor activity, and thought processes  Chest - clear to auscultation, no wheezes, rales or rhonchi, symmetric air entry  Heart - normal rate, regular rhythm, normal S1, S2, no murmurs, rubs, clicks or gallops  Extremities - peripheral pulses normal, no pedal edema, no clubbing or cyanosis    LABS   Office Visit on 10/05/2017   Component Date Value Ref Range Status    Triglyceride 10/05/2017 181* 40 - 149 mg/dL Final    HDL Cholesterol 10/05/2017 33* 40 - 59 mg/dL Final    Cholesterol, total 10/05/2017 131  110 - 200 mg/dL Final    LDL, calculated 10/05/2017 62  50 - 99 mg/dL Final    VLDL, calculated 10/05/2017 36* 8 - 30 mg/dL Final    Comment: Test includes cholesterol, HDL cholesterol, triglycerides and LDL. Cholesterol Recommended NCEP guidelines in mg/dL:  Less than 200      Desirable  200 - 239          Borderline High  Greater than or  = to 240   High      Glucose 10/05/2017 87  70 - 99 mg/dL Final    BUN 10/05/2017 7  6 - 22 mg/dL Final    Creatinine 10/05/2017 0.6  0.5 - 1.2 mg/dL Final    Sodium 10/05/2017 142  133 - 145 mmol/L Final    Potassium 10/05/2017 4.5  3.5 - 5.5 mmol/L Final    Chloride 10/05/2017 97* 98 - 110 mmol/L Final    CO2 10/05/2017 29  20 - 32 mmol/L Final    AST (SGOT) 10/05/2017 35  10 - 37 U/L Final    ALT (SGPT) 10/05/2017 53* 5 - 40 U/L Final    Alk. phosphatase 10/05/2017 108  25 - 115 U/L Final    Bilirubin, total 10/05/2017 0.5  0.2 - 1.2 mg/dL Final    Calcium 10/05/2017 9.6  8.4 - 10.4 mg/dL Final    Protein, total 10/05/2017 6.8  6.4 - 8.3 g/dL Final    Albumin 10/05/2017 4.3  3.5 - 5.0 g/dL Final    A-G Ratio 10/05/2017 1.7  1.1 - 2.6 ratio Final    Globulin 10/05/2017 2.5  2.0 - 4.0 g/dL Final    Anion gap 10/05/2017 16.0  mmol/L Final    Comment: Test includes Albumin, Alkaline Phosphatase, ALT, AST, BUN, Calcium, CO2,  Chloride, Creatinine, Glucose, Potassium, Sodium, Total Bilirubin and Total  Protein. Estimated GFR results are reported in mL/min/1.73 sq.m. by the MDRD equation. This eGFR is validated for stable chronic renal failure patients. This   equation  is unreliable in acute illness or patients with normal renal function.       GFRAA 10/05/2017 >60.0  >60.0 Final    GFRNA 10/05/2017 >60.0  >60.0 Final    Creatinine, urine 10/05/2017 225  mg/dL Final    Microalbumin, urine 10/05/2017 18.7* 0.1 - 17.0 ug/mL Final  Microalb/Creat ratio (ug/mg creat.) 10/05/2017 8.3  0.0 - 30.0 mcg/mg of Creatinine Final    Hemoglobin A1c 10/05/2017 6.2* 4.8 - 5.9 % Final    AVG GLU 10/05/2017 131* 91 - 123 mg/dL Final       TESTS  Results for orders placed or performed in visit on 10/05/17   LIPID PANEL   Result Value Ref Range    Triglyceride 181 (H) 40 - 149 mg/dL    HDL Cholesterol 33 (L) 40 - 59 mg/dL    Cholesterol, total 131 110 - 200 mg/dL    LDL, calculated 62 50 - 99 mg/dL    VLDL, calculated 36 (H) 8 - 30 mg/dL   METABOLIC PANEL, COMPREHENSIVE   Result Value Ref Range    Glucose 87 70 - 99 mg/dL    BUN 7 6 - 22 mg/dL    Creatinine 0.6 0.5 - 1.2 mg/dL    Sodium 142 133 - 145 mmol/L    Potassium 4.5 3.5 - 5.5 mmol/L    Chloride 97 (L) 98 - 110 mmol/L    CO2 29 20 - 32 mmol/L    AST (SGOT) 35 10 - 37 U/L    ALT (SGPT) 53 (H) 5 - 40 U/L    Alk. phosphatase 108 25 - 115 U/L    Bilirubin, total 0.5 0.2 - 1.2 mg/dL    Calcium 9.6 8.4 - 10.4 mg/dL    Protein, total 6.8 6.4 - 8.3 g/dL    Albumin 4.3 3.5 - 5.0 g/dL    A-G Ratio 1.7 1.1 - 2.6 ratio    Globulin 2.5 2.0 - 4.0 g/dL    Anion gap 16.0 mmol/L    GFRAA >60.0 >60.0    GFRNA >60.0 >60.0   MICROALBUMIN, UR, RAND W/ MICROALBUMIN/CREA RATIO   Result Value Ref Range    Creatinine, urine 225 mg/dL    Microalbumin, urine 18.7 (H) 0.1 - 17.0 ug/mL    Microalb/Creat ratio (ug/mg creat.) 8.3 0.0 - 30.0 mcg/mg of Creatinine   HEMOGLOBIN A1C W/O EAG   Result Value Ref Range    Hemoglobin A1c 6.2 (H) 4.8 - 5.9 %    AVG  (H) 91 - 123 mg/dL     Assessment/Plan:            Lab review: orders written for new lab studies as appropriate; see orders. I have discussed the diagnosis with the patient and the intended plan as seen in the above orders. The patient has received an after-visit summary and questions were answered concerning future plans. I have discussed medication side effects and warnings with the patient as well. I have reviewed the plan of care with the patient, accepted their input and they are in agreement with the treatment goals. F/U as needed.      Rebeca Jordan MD

## 2018-01-31 NOTE — PROGRESS NOTES
Kim Noland is a 38 y.o. male presents to office for panic attacks for about 4 days.       1. Have you been to the ER, urgent care clinic or hospitalized since your last visit? no      Health Maintenance items with a due date reviewed with patient:  Health Maintenance Due   Topic Date Due    FOOT EXAM Q1  04/25/1983    EYE EXAM RETINAL OR DILATED Q1  04/25/1983    Pneumococcal 19-64 Medium Risk (1 of 1 - PPSV23) 04/25/1992    DTaP/Tdap/Td series (1 - Tdap) 04/25/1994    Influenza Age 9 to Adult  08/01/2017

## 2018-02-01 ENCOUNTER — TELEPHONE (OUTPATIENT)
Dept: FAMILY MEDICINE CLINIC | Age: 43
End: 2018-02-01

## 2018-02-01 LAB
A-G RATIO,AGRAT: 1.5 RATIO (ref 1.1–2.6)
ABSOLUTE LYMPHOCYTE COUNT, 10803: 4 K/UL (ref 1–4.8)
ALBUMIN SERPL-MCNC: 4.2 G/DL (ref 3.5–5)
ALP SERPL-CCNC: 97 U/L (ref 25–115)
ALT SERPL-CCNC: 68 U/L (ref 5–40)
ANION GAP SERPL CALC-SCNC: 14 MMOL/L
AST SERPL W P-5'-P-CCNC: 43 U/L (ref 10–37)
AVG GLU, 10930: 142 MG/DL (ref 91–123)
BASOPHILS # BLD: 0 K/UL (ref 0–0.2)
BASOPHILS NFR BLD: 0 % (ref 0–2)
BILIRUB SERPL-MCNC: 0.5 MG/DL (ref 0.2–1.2)
BUN SERPL-MCNC: 15 MG/DL (ref 6–22)
CALCIUM SERPL-MCNC: 9.4 MG/DL (ref 8.4–10.4)
CHLORIDE SERPL-SCNC: 96 MMOL/L (ref 98–110)
CHOLEST SERPL-MCNC: 128 MG/DL (ref 110–200)
CO2 SERPL-SCNC: 28 MMOL/L (ref 20–32)
CREAT SERPL-MCNC: 0.6 MG/DL (ref 0.5–1.2)
EOSINOPHIL # BLD: 0.3 K/UL (ref 0–0.5)
EOSINOPHIL NFR BLD: 2 % (ref 0–6)
ERYTHROCYTE [DISTWIDTH] IN BLOOD BY AUTOMATED COUNT: 12.8 % (ref 10–16)
GFRAA, 66117: >60
GFRNA, 66118: >60
GLOBULIN,GLOB: 2.8 G/DL (ref 2–4)
GLUCOSE SERPL-MCNC: 107 MG/DL (ref 70–99)
GRANULOCYTES,GRANS: 58 % (ref 40–75)
HBA1C MFR BLD HPLC: 6.6 % (ref 4.8–5.9)
HCT VFR BLD AUTO: 45.8 % (ref 36.6–51.9)
HDLC SERPL-MCNC: 29 MG/DL (ref 40–59)
HGB BLD-MCNC: 14.6 G/DL (ref 13.2–17.3)
LDLC SERPL CALC-MCNC: 68 MG/DL (ref 50–99)
LYMPHOCYTES, LYMLT: 33 % (ref 27–45)
MCH RBC QN AUTO: 29 PG (ref 26–34)
MCHC RBC AUTO-ENTMCNC: 32 G/DL (ref 32–36)
MCV RBC AUTO: 92 FL (ref 80–95)
MONOCYTES # BLD: 0.8 K/UL (ref 0.1–0.9)
MONOCYTES NFR BLD: 6 % (ref 3–9)
NEUTROPHILS # BLD AUTO: 7 K/UL (ref 1.8–7.7)
PLATELET # BLD AUTO: 298 K/UL (ref 140–440)
PMV BLD AUTO: 10.1 FL (ref 6–10.8)
POTASSIUM SERPL-SCNC: 4.2 MMOL/L (ref 3.5–5.5)
PROT SERPL-MCNC: 7 G/DL (ref 6.4–8.3)
RBC # BLD AUTO: 4.98 M/UL (ref 3.8–5.8)
SODIUM SERPL-SCNC: 138 MMOL/L (ref 133–145)
TRIGL SERPL-MCNC: 152 MG/DL (ref 40–149)
TSH SERPL DL<=0.005 MIU/L-ACNC: 1.58 MCU/ML (ref 0.27–4.2)
VLDLC SERPL CALC-MCNC: 30 MG/DL (ref 8–30)
WBC # BLD AUTO: 12.1 K/UL (ref 4–11)

## 2018-02-01 NOTE — PROGRESS NOTES
Pls inform patient his labs normal to near normal. A1C went up a bit to 6.6 but no med changes for now. Maybe to try diet and exercise and recheck fasting labs in 3 months.

## 2018-02-01 NOTE — TELEPHONE ENCOUNTER
----- Message from Yumiko Guerrero MD sent at 2/1/2018  7:31 AM EST -----  Pls inform patient his labs normal to near normal. A1C went up a bit to 6.6 but no med changes for now. Maybe to try diet and exercise and recheck fasting labs in 3 months.

## 2018-02-01 NOTE — TELEPHONE ENCOUNTER
Pt /daughter are aware of results and agrees to return for fasting lab in 3 months. No further questions at this time.

## 2018-03-01 DIAGNOSIS — E78.5 DYSLIPIDEMIA: ICD-10-CM

## 2018-03-01 RX ORDER — ATORVASTATIN CALCIUM 20 MG/1
20 TABLET, FILM COATED ORAL DAILY
Qty: 90 TAB | Refills: 2 | Status: SHIPPED | OUTPATIENT
Start: 2018-03-01 | End: 2018-04-27 | Stop reason: SDUPTHER

## 2018-03-01 NOTE — TELEPHONE ENCOUNTER
PLEASE REFILL. Requested Prescriptions     Pending Prescriptions Disp Refills    atorvastatin (LIPITOR) 20 mg tablet 90 Tab 2     Sig: Take 1 Tab by mouth daily.

## 2018-04-27 ENCOUNTER — OFFICE VISIT (OUTPATIENT)
Dept: FAMILY MEDICINE CLINIC | Age: 43
End: 2018-04-27

## 2018-04-27 VITALS
HEIGHT: 67 IN | HEART RATE: 77 BPM | OXYGEN SATURATION: 97 % | BODY MASS INDEX: 43.95 KG/M2 | RESPIRATION RATE: 19 BRPM | TEMPERATURE: 97.1 F | DIASTOLIC BLOOD PRESSURE: 77 MMHG | SYSTOLIC BLOOD PRESSURE: 124 MMHG | WEIGHT: 280 LBS

## 2018-04-27 DIAGNOSIS — Z79.4 TYPE 2 DIABETES MELLITUS WITH HYPERGLYCEMIA, WITH LONG-TERM CURRENT USE OF INSULIN (HCC): Primary | ICD-10-CM

## 2018-04-27 DIAGNOSIS — I10 ESSENTIAL HYPERTENSION: ICD-10-CM

## 2018-04-27 DIAGNOSIS — E11.65 TYPE 2 DIABETES MELLITUS WITH HYPERGLYCEMIA, WITH LONG-TERM CURRENT USE OF INSULIN (HCC): Primary | ICD-10-CM

## 2018-04-27 DIAGNOSIS — R74.8 ELEVATED LIVER ENZYMES: ICD-10-CM

## 2018-04-27 DIAGNOSIS — E11.65 TYPE 2 DIABETES MELLITUS WITH HYPERGLYCEMIA, WITHOUT LONG-TERM CURRENT USE OF INSULIN (HCC): ICD-10-CM

## 2018-04-27 DIAGNOSIS — K76.0 FATTY LIVER: ICD-10-CM

## 2018-04-27 DIAGNOSIS — E78.5 DYSLIPIDEMIA: ICD-10-CM

## 2018-04-27 DIAGNOSIS — I11.9 BENIGN HYPERTENSIVE HEART DISEASE WITHOUT HEART FAILURE: ICD-10-CM

## 2018-04-27 RX ORDER — METFORMIN HYDROCHLORIDE 500 MG/1
500 TABLET ORAL
Qty: 90 TAB | Refills: 1 | Status: SHIPPED | OUTPATIENT
Start: 2018-04-27

## 2018-04-27 RX ORDER — HYDROCHLOROTHIAZIDE 25 MG/1
25 TABLET ORAL DAILY
Qty: 90 TAB | Refills: 1 | Status: SHIPPED | OUTPATIENT
Start: 2018-04-27 | End: 2018-06-29 | Stop reason: SDUPTHER

## 2018-04-27 RX ORDER — LISINOPRIL 40 MG/1
40 TABLET ORAL DAILY
Qty: 90 TAB | Refills: 1 | Status: SHIPPED | OUTPATIENT
Start: 2018-04-27 | End: 2018-06-29 | Stop reason: SDUPTHER

## 2018-04-27 RX ORDER — EZETIMIBE 10 MG/1
10 TABLET ORAL DAILY
Qty: 90 TAB | Refills: 1 | Status: SHIPPED | OUTPATIENT
Start: 2018-04-27 | End: 2018-06-29 | Stop reason: SDUPTHER

## 2018-04-27 RX ORDER — AMLODIPINE BESYLATE 10 MG/1
10 TABLET ORAL DAILY
Qty: 90 TAB | Refills: 2 | Status: SHIPPED | OUTPATIENT
Start: 2018-04-27 | End: 2018-06-29 | Stop reason: SDUPTHER

## 2018-04-27 RX ORDER — ATORVASTATIN CALCIUM 20 MG/1
20 TABLET, FILM COATED ORAL DAILY
Qty: 90 TAB | Refills: 2 | Status: SHIPPED | OUTPATIENT
Start: 2018-04-27 | End: 2018-06-29 | Stop reason: SDUPTHER

## 2018-04-27 NOTE — PROGRESS NOTES
Majo Sales is a 37 y.o. male presents to office for dm, htn, and cholesterol        Health Maintenance items with a due date reviewed with patient:  Health Maintenance Due   Topic Date Due    FOOT EXAM Q1  04/25/1983    EYE EXAM RETINAL OR DILATED Q1  04/25/1983    Pneumococcal 19-64 Medium Risk (1 of 1 - PPSV23) 04/25/1992    DTaP/Tdap/Td series (1 - Tdap) 04/25/1994

## 2018-04-27 NOTE — PATIENT INSTRUCTIONS

## 2018-04-27 NOTE — PROGRESS NOTES
Syd Reese is a 37 y.o.  male and presents with f/u for HTN, high chol and DM2. He denies any current complaint. Chief Complaint   Patient presents with    Hypertension    Diabetes    Cholesterol Problem     Subjective: Additional Concerns: none     Patient Active Problem List    Diagnosis Date Noted    Obesity, morbid ([facility] 01/26/2018    Type 2 diabetes mellitus with hyperglycemia, without long-term current use of insulin ([facility] 12/29/2016    Fatty liver 06/03/2016    Hypertension     Dyslipidemia     Abnormal EKG     Sleep apnea      Current Outpatient Prescriptions   Medication Sig Dispense Refill    atorvastatin (LIPITOR) 20 mg tablet Take 1 Tab by mouth daily. 90 Tab 2    amLODIPine (NORVASC) 10 mg tablet Take 1 Tab by mouth daily. 90 Tab 2    hydroCHLOROthiazide (HYDRODIURIL) 25 mg tablet Take 1 Tab by mouth daily. 90 Tab 1    ezetimibe (ZETIA) 10 mg tablet Take 1 Tab by mouth daily. Repeat liver and lipid labs needed one month  Indications: hyperlipidemia 90 Tab 1    SITagliptin-metFORMIN (JANUMET) 50-1,000 mg per tablet Take 1 Tab by mouth two (2) times daily (with meals). 180 Tab 2    metFORMIN (GLUCOPHAGE) 500 mg tablet Take 1 Tab by mouth daily (with breakfast). Indications: type 2 diabetes mellitus 90 Tab 1    lisinopril (PRINIVIL, ZESTRIL) 40 mg tablet Take 1 Tab by mouth daily. 90 Tab 1    glucose blood VI test strips (ONETOUCH VERIO) strip Pt to test blood sugar one time daily 200 Strip 1    lancets (ONETOUCH DELICA LANCETS) 30 gauge misc Pt to test blood sugar one time daily 200 Lancet 1    cpap machine kit by Does Not Apply route. Change BiPAP to 17/13 cm with humidifier. Mask: Simplus FF, medium size. Length of need 99 months. Replace mask and accessories as needed times 12 months. Download data at 30 days and fax at [phone]. Dx-Severe HANNA, Morbid obesity. DME- [facility] 1 Kit 0    atorvastatin (LIPITOR) 10 mg tablet Take 1 Tab by mouth daily.  90 Tab 1     No Known Allergies  Past Medical History:   Diagnosis Date    Contact dermatitis     Dyslipidemia     Erectile dysfunction     Fatty liver 6/3/2016    Hypertension     Obesity     Sleep apnea     Type 2 diabetes mellitus with hyperglycemia, without long-term current use of insulin ([facility] 12/29/2016     Past Surgical History:   Procedure Laterality Date    HX APPENDECTOMY      HX KNEE ARTHROSCOPY  2013    left knee     Family History   Problem Relation Age of Onset    Hypertension Mother     No Known Problems Father     Cancer Sister      breast    Hypertension Maternal Grandmother     No Known Problems Sister     No Known Problems Sister     No Known Problems Sister      Social History   Substance Use Topics    Smoking status: Former Smoker     Types: Cigarettes     Quit date: 9/1/2014    Smokeless tobacco: Never Used      Comment: quit cigarettes about 4 months ago:9/2014    Alcohol use No     ROS     General: negative for - chills, fatigue, fever, weight change  Psych: negative for - anxiety, depression, irritability or mood swings  Resp: negative for - cough, shortness of breath or wheezing  CV: negative for - chest pain, edema or palpitations  Neuro: negative for - confusion, headaches, seizures or weakness    Objective:  Vitals:    04/27/18 0911   BP: 124/77   Pulse: 77   Resp: 19   Temp: 97.1 °F (36.2 °C)   TempSrc: Oral   SpO2: 97%   Weight: 280 lb (127 kg)   Height: 5' 7\" (1.702 m)   PainSc:   0 - No pain     PE    Alert, well appearing, and in no distress, oriented to person, place, and time and overweight  General appearance - alert, well appearing, and in no distress, oriented to person, place, and time and overweight  Mental status - alert, oriented to person, place, and time, normal mood, behavior, speech, dress, motor activity, and thought processes  Chest - clear to auscultation, no wheezes, rales or rhonchi, symmetric air entry  Heart - normal rate, regular rhythm, normal S1, S2, no murmurs, rubs, clicks or gallops  Extremities - peripheral pulses normal, no pedal edema, no clubbing or cyanosis    LABS   Office Visit on 01/31/2018   Component Date Value Ref Range Status    TSH 01/31/2018 1.58  0.27 - 4.20 mcU/mL Final    WBC 01/31/2018 12.1* 4.0 - 11.0 K/uL Final    RBC 01/31/2018 4.98  3.80 - 5.80 M/uL Final    HGB 01/31/2018 14.6  13.2 - 17.3 g/dL Final    HCT 01/31/2018 45.8  36.6 - 51.9 % Final    MCV 01/31/2018 92  80 - 95 fL Final    MCH 01/31/2018 29  26 - 34 pg Final    MCHC 01/31/2018 32  32 - 36 g/dL Final    RDW 01/31/2018 12.8  10.0 - 16.0 % Final    PLATELET 30/13/9031 091  140 - 440 K/uL Final    MPV 01/31/2018 10.1  6.0 - 10.8 fL Final    NEUTROPHILS 01/31/2018 58  40 - 75 % Final    Lymphocytes 01/31/2018 33  27 - 45 % Final    MONOCYTES 01/31/2018 6  3 - 9 % Final    EOSINOPHILS 01/31/2018 2  0 - 6 % Final    BASOPHILS 01/31/2018 0  0 - 2 % Final    ABS. NEUTROPHILS 01/31/2018 7.0  1.8 - 7.7 K/uL Final    ABSOLUTE LYMPHOCYTE COUNT 01/31/2018 4.0  1.0 - 4.8 K/uL Final    ABS. MONOCYTES 01/31/2018 0.8  0.1 - 0.9 K/uL Final    ABS. EOSINOPHILS 01/31/2018 0.3  0.0 - 0.5 K/uL Final    ABS. BASOPHILS 01/31/2018 0.0  0.0 - 0.2 K/uL Final    Hemoglobin A1c 01/31/2018 6.6* 4.8 - 5.9 % Final    AVG GLU 01/31/2018 142* 91 - 123 mg/dL Final   Office Visit on 01/26/2018   Component Date Value Ref Range Status    Triglyceride 01/31/2018 152* 40 - 149 mg/dL Final    HDL Cholesterol 01/31/2018 29* 40 - 59 mg/dL Final    Cholesterol, total 01/31/2018 128  110 - 200 mg/dL Final    LDL, calculated 01/31/2018 68  50 - 99 mg/dL Final    VLDL, calculated 01/31/2018 30  8 - 30 mg/dL Final    Comment: Test includes cholesterol, HDL cholesterol, triglycerides and LDL.   Cholesterol Recommended NCEP guidelines in mg/dL:  Less than 200      Desirable  200 - 239          Borderline High  Greater than or  = to 240   High      Glucose 01/31/2018 107* 70 - 99 mg/dL Final    BUN 01/31/2018 15  6 - 22 mg/dL Final    Creatinine 01/31/2018 0.6  0.5 - 1.2 mg/dL Final    Sodium 01/31/2018 138  133 - 145 mmol/L Final    Potassium 01/31/2018 4.2  3.5 - 5.5 mmol/L Final    Chloride 01/31/2018 96* 98 - 110 mmol/L Final    CO2 01/31/2018 28  20 - 32 mmol/L Final    AST (SGOT) 01/31/2018 43* 10 - 37 U/L Final    ALT (SGPT) 01/31/2018 68* 5 - 40 U/L Final    Alk. phosphatase 01/31/2018 97  25 - 115 U/L Final    Bilirubin, total 01/31/2018 0.5  0.2 - 1.2 mg/dL Final    Calcium 01/31/2018 9.4  8.4 - 10.4 mg/dL Final    Protein, total 01/31/2018 7.0  6.4 - 8.3 g/dL Final    Albumin 01/31/2018 4.2  3.5 - 5.0 g/dL Final    A-G Ratio 01/31/2018 1.5  1.1 - 2.6 ratio Final    Globulin 01/31/2018 2.8  2.0 - 4.0 g/dL Final    Anion gap 01/31/2018 14.0  mmol/L Final    Comment: Test includes Albumin, Alkaline Phosphatase, ALT, AST, BUN, Calcium, CO2,  Chloride, Creatinine, Glucose, Potassium, Sodium, Total Bilirubin and Total  Protein. Estimated GFR results are reported in mL/min/1.73 sq.m. by the MDRD equation. This eGFR is validated for stable chronic renal failure patients. This   equation  is unreliable in acute illness or patients with normal renal function.  GFRAA 01/31/2018 >60.0  >60.0 Final    GFRNA 01/31/2018 >60.0  >60.0 Final       TESTS  Results for orders placed or performed in visit on 01/31/18   TSH 3RD GENERATION   Result Value Ref Range    TSH 1.58 0.27 - 4.20 mcU/mL   CBC WITH AUTOMATED DIFF   Result Value Ref Range    WBC 12.1 (H) 4.0 - 11.0 K/uL    RBC 4.98 3.80 - 5.80 M/uL    HGB 14.6 13.2 - 17.3 g/dL    HCT 45.8 36.6 - 51.9 %    MCV 92 80 - 95 fL    MCH 29 26 - 34 pg    MCHC 32 32 - 36 g/dL    RDW 12.8 10.0 - 16.0 %    PLATELET 133 501 - 807 K/uL    MPV 10.1 6.0 - 10.8 fL    NEUTROPHILS 58 40 - 75 %    Lymphocytes 33 27 - 45 %    MONOCYTES 6 3 - 9 %    EOSINOPHILS 2 0 - 6 %    BASOPHILS 0 0 - 2 %    ABS.  NEUTROPHILS 7.0 1.8 - 7.7 K/uL    ABSOLUTE LYMPHOCYTE COUNT 4.0 1.0 - 4.8 K/uL    ABS. MONOCYTES 0.8 0.1 - 0.9 K/uL    ABS. EOSINOPHILS 0.3 0.0 - 0.5 K/uL    ABS. BASOPHILS 0.0 0.0 - 0.2 K/uL   HEMOGLOBIN A1C W/O EAG   Result Value Ref Range    Hemoglobin A1c 6.6 (H) 4.8 - 5.9 %    AVG  (H) 91 - 123 mg/dL     Assessment/Plan:      1. Type 2 diabetes mellitus with hyperglycemia, with long-term current use of insulin (HCC)  - LIPID PANEL; Future  - METABOLIC PANEL, COMPREHENSIVE; Future  - HEMOGLOBIN A1C WITH EAG; Future  - LIPID PANEL  - METABOLIC PANEL, COMPREHENSIVE  - HEMOGLOBIN A1C WITH EAG    2. Dyslipidemia  - atorvastatin (LIPITOR) 20 mg tablet; Take 1 Tab by mouth daily. Dispense: 90 Tab; Refill: 2  - ezetimibe (ZETIA) 10 mg tablet; Take 1 Tab by mouth daily. Repeat liver and lipid labs needed one month  Indications: hyperlipidemia  Dispense: 90 Tab; Refill: 1    3. Essential hypertension  - amLODIPine (NORVASC) 10 mg tablet; Take 1 Tab by mouth daily. Dispense: 90 Tab; Refill: 2    4. Hypertension  - hydroCHLOROthiazide (HYDRODIURIL) 25 mg tablet; Take 1 Tab by mouth daily. Dispense: 90 Tab; Refill: 1    6. Elevated liver enzymes  - ezetimibe (ZETIA) 10 mg tablet; Take 1 Tab by mouth daily. Repeat liver and lipid labs needed one month  Indications: hyperlipidemia  Dispense: 90 Tab; Refill: 1    7. Type 2 diabetes mellitus with hyperglycemia, without long-term current use of insulin ([facility])  - SITagliptin-metFORMIN (JANUMET) 50-1,000 mg per tablet; Take 1 Tab by mouth two (2) times daily (with meals). Dispense: 180 Tab; Refill: 2  - metFORMIN (GLUCOPHAGE) 500 mg tablet; Take 1 Tab by mouth daily (with breakfast). Indications: type 2 diabetes mellitus  Dispense: 90 Tab; Refill: 1    Lab review: orders written for new lab studies as appropriate; see orders. I have discussed the diagnosis with the patient and the intended plan as seen in the above orders. The patient has received an after-visit summary and questions were answered concerning future plans.   I have discussed medication side effects and warnings with the patient as well. I have reviewed the plan of care with the patient, accepted their input and they are in agreement with the treatment goals. Follow-up Disposition:  Return in about 3 months (around 7/27/2018), or if symptoms worsen or fail to improve.     Chuck Cruz MD

## 2018-04-28 LAB
A-G RATIO,AGRAT: 2.1 RATIO (ref 1.1–2.6)
ALBUMIN SERPL-MCNC: 4.7 G/DL (ref 3.5–5)
ALP SERPL-CCNC: 98 U/L (ref 25–115)
ALT SERPL-CCNC: 43 U/L (ref 5–40)
ANION GAP SERPL CALC-SCNC: 18 MMOL/L
AST SERPL W P-5'-P-CCNC: 24 U/L (ref 10–37)
AVG GLU, 10930: 124 MG/DL (ref 91–123)
BILIRUB SERPL-MCNC: 0.4 MG/DL (ref 0.2–1.2)
BUN SERPL-MCNC: 16 MG/DL (ref 6–22)
CALCIUM SERPL-MCNC: 9.5 MG/DL (ref 8.4–10.4)
CHLORIDE SERPL-SCNC: 105 MMOL/L (ref 98–110)
CHOLEST SERPL-MCNC: 107 MG/DL (ref 110–200)
CO2 SERPL-SCNC: 22 MMOL/L (ref 20–32)
CREAT SERPL-MCNC: 0.7 MG/DL (ref 0.5–1.2)
GFRAA, 66117: >60
GFRNA, 66118: >60
GLOBULIN,GLOB: 2.2 G/DL (ref 2–4)
GLUCOSE SERPL-MCNC: 91 MG/DL (ref 70–99)
HBA1C MFR BLD HPLC: 6 % (ref 4.8–5.9)
HDLC SERPL-MCNC: 3.5 MG/DL (ref 0–5)
HDLC SERPL-MCNC: 31 MG/DL (ref 40–59)
LDLC SERPL CALC-MCNC: 60 MG/DL (ref 50–99)
POTASSIUM SERPL-SCNC: 4.3 MMOL/L (ref 3.5–5.5)
PROT SERPL-MCNC: 6.9 G/DL (ref 6.4–8.3)
SODIUM SERPL-SCNC: 145 MMOL/L (ref 133–145)
TRIGL SERPL-MCNC: 81 MG/DL (ref 40–149)
VLDLC SERPL CALC-MCNC: 16 MG/DL (ref 8–30)

## 2018-05-23 ENCOUNTER — TELEPHONE (OUTPATIENT)
Dept: FAMILY MEDICINE CLINIC | Age: 43
End: 2018-05-23

## 2018-05-23 NOTE — TELEPHONE ENCOUNTER
Attempted to contact patient but no answer, message left on VM. Will try again later.  Thank you  Misti Rendon LPN

## 2018-05-23 NOTE — PROGRESS NOTES
Attempted to contact patient but no answer, message left on VM. Will try again later.  Thank you  Jay Ratliff LPN

## 2018-05-23 NOTE — PROGRESS NOTES
Pls inform patient in [place] of improved A1C and trig. Rest of labs essentially under control. Keep up the good work and   F/U in 3 months.

## 2018-05-23 NOTE — TELEPHONE ENCOUNTER
----- Message from Carol Gonzalez MD sent at 5/23/2018  6:20 AM EDT -----  Pls inform patient in [place] of improved A1C and trig. Rest of labs essentially under control. Keep up the good work and   F/U in 3 months.

## 2018-06-12 NOTE — TELEPHONE ENCOUNTER
Received prior authorization fax from insurance company stating that the insurance requires a 3 month trial and failure of Jentadueto, Kazano, or Kombiglyze XR. Per Dr Elva Milan. Changing to Jentadueto 2.5-1,000 mg tablet bid.

## 2018-06-26 DIAGNOSIS — E11.65 TYPE 2 DIABETES MELLITUS WITH HYPERGLYCEMIA, WITHOUT LONG-TERM CURRENT USE OF INSULIN (HCC): ICD-10-CM

## 2018-06-26 NOTE — TELEPHONE ENCOUNTER
Patient need authorization for Janumet 50-1,000 mg, per OSBALDO Phillips [phone]   please call [facility] when authorize

## 2018-06-29 DIAGNOSIS — I10 ESSENTIAL HYPERTENSION: ICD-10-CM

## 2018-06-29 DIAGNOSIS — E78.5 DYSLIPIDEMIA: ICD-10-CM

## 2018-06-29 DIAGNOSIS — R74.8 ELEVATED LIVER ENZYMES: ICD-10-CM

## 2018-06-29 DIAGNOSIS — I11.9 BENIGN HYPERTENSIVE HEART DISEASE WITHOUT HEART FAILURE: ICD-10-CM

## 2018-06-29 DIAGNOSIS — K76.0 FATTY LIVER: ICD-10-CM

## 2018-06-29 RX ORDER — LISINOPRIL 40 MG/1
40 TABLET ORAL DAILY
Qty: 90 TAB | Refills: 1 | Status: SHIPPED | OUTPATIENT
Start: 2018-06-29 | End: 2018-09-04 | Stop reason: SDUPTHER

## 2018-06-29 RX ORDER — EZETIMIBE 10 MG/1
10 TABLET ORAL DAILY
Qty: 90 TAB | Refills: 1 | Status: SHIPPED | OUTPATIENT
Start: 2018-06-29 | End: 2018-09-04 | Stop reason: SDUPTHER

## 2018-06-29 RX ORDER — AMLODIPINE BESYLATE 10 MG/1
TABLET ORAL
Qty: 90 TAB | Refills: 2 | Status: SHIPPED | OUTPATIENT
Start: 2018-06-29 | End: 2018-09-04 | Stop reason: SDUPTHER

## 2018-06-29 RX ORDER — AMLODIPINE BESYLATE 10 MG/1
10 TABLET ORAL DAILY
Qty: 90 TAB | Refills: 2 | Status: SHIPPED | OUTPATIENT
Start: 2018-06-29 | End: 2018-06-29 | Stop reason: SDUPTHER

## 2018-06-29 RX ORDER — HYDROCHLOROTHIAZIDE 25 MG/1
25 TABLET ORAL DAILY
Qty: 90 TAB | Refills: 1 | Status: SHIPPED | OUTPATIENT
Start: 2018-06-29 | End: 2018-09-04 | Stop reason: SDUPTHER

## 2018-06-29 RX ORDER — ATORVASTATIN CALCIUM 20 MG/1
20 TABLET, FILM COATED ORAL DAILY
Qty: 90 TAB | Refills: 2 | Status: SHIPPED | OUTPATIENT
Start: 2018-06-29 | End: 2018-09-04 | Stop reason: SDUPTHER

## 2018-06-29 NOTE — TELEPHONE ENCOUNTER
Pt calling to request medication refill of:  Requested Prescriptions     Pending Prescriptions Disp Refills    hydroCHLOROthiazide (HYDRODIURIL) 25 mg tablet 90 Tab 1     Sig: Take 1 Tab by mouth daily.  lisinopril (PRINIVIL, ZESTRIL) 40 mg tablet 90 Tab 1     Sig: Take 1 Tab by mouth daily.  atorvastatin (LIPITOR) 20 mg tablet 90 Tab 2     Sig: Take 1 Tab by mouth daily.  ezetimibe (ZETIA) 10 mg tablet 90 Tab 1     Sig: Take 1 Tab by mouth daily. Repeat liver and lipid labs needed one month  Indications: hyperlipidemia    amLODIPine (NORVASC) 10 mg tablet 90 Tab 2     Sig: Take 1 Tab by mouth daily. be sent to  [address]  Pt has about 0 tabs remaining. Pts last appt was 4/27  Advised pt of 72 hour time frame for refill requests. Please advise.     Pt will be going to [facility] for 3 months next week

## 2018-06-29 NOTE — TELEPHONE ENCOUNTER
Dr. Juan Antonio Vazquez, please see refill request for patient, thank you! Requested Prescriptions     Pending Prescriptions Disp Refills    hydroCHLOROthiazide (HYDRODIURIL) 25 mg tablet 90 Tab 1     Sig: Take 1 Tab by mouth daily.  lisinopril (PRINIVIL, ZESTRIL) 40 mg tablet 90 Tab 1     Sig: Take 1 Tab by mouth daily.  atorvastatin (LIPITOR) 20 mg tablet 90 Tab 2     Sig: Take 1 Tab by mouth daily.  ezetimibe (ZETIA) 10 mg tablet 90 Tab 1     Sig: Take 1 Tab by mouth daily. Repeat liver and lipid labs needed one month  Indications: hyperlipidemia    amLODIPine (NORVASC) 10 mg tablet 90 Tab 2     Sig: Take 1 Tab by mouth daily.

## 2018-09-04 DIAGNOSIS — I11.9 BENIGN HYPERTENSIVE HEART DISEASE WITHOUT HEART FAILURE: ICD-10-CM

## 2018-09-04 DIAGNOSIS — E78.5 DYSLIPIDEMIA: ICD-10-CM

## 2018-09-04 DIAGNOSIS — I10 ESSENTIAL HYPERTENSION: ICD-10-CM

## 2018-09-04 DIAGNOSIS — R74.8 ELEVATED LIVER ENZYMES: ICD-10-CM

## 2018-09-04 DIAGNOSIS — K76.0 FATTY LIVER: ICD-10-CM

## 2018-09-04 RX ORDER — AMLODIPINE BESYLATE 10 MG/1
10 TABLET ORAL DAILY
Qty: 90 TAB | Refills: 1 | Status: SHIPPED | OUTPATIENT
Start: 2018-09-04

## 2018-09-04 RX ORDER — LISINOPRIL 40 MG/1
40 TABLET ORAL DAILY
Qty: 90 TAB | Refills: 1 | Status: SHIPPED | OUTPATIENT
Start: 2018-09-04

## 2018-09-04 RX ORDER — ATORVASTATIN CALCIUM 20 MG/1
20 TABLET, FILM COATED ORAL DAILY
Qty: 90 TAB | Refills: 1 | Status: SHIPPED | OUTPATIENT
Start: 2018-09-04

## 2018-09-04 RX ORDER — EZETIMIBE 10 MG/1
10 TABLET ORAL DAILY
Qty: 90 TAB | Refills: 1 | Status: SHIPPED | OUTPATIENT
Start: 2018-09-04 | End: 2019-01-29 | Stop reason: SDUPTHER

## 2018-09-04 RX ORDER — HYDROCHLOROTHIAZIDE 25 MG/1
25 TABLET ORAL DAILY
Qty: 90 TAB | Refills: 1 | Status: SHIPPED | OUTPATIENT
Start: 2018-09-04

## 2018-09-04 NOTE — TELEPHONE ENCOUNTER
Requested Prescriptions     Pending Prescriptions Disp Refills    amLODIPine (NORVASC) 10 mg tablet 90 Tab 2    ezetimibe (ZETIA) 10 mg tablet 90 Tab 1     Sig: Take 1 Tab by mouth daily. Repeat liver and lipid labs needed one month  Indications: hyperlipidemia    hydroCHLOROthiazide (HYDRODIURIL) 25 mg tablet 90 Tab 1     Sig: Take 1 Tab by mouth daily.  lisinopril (PRINIVIL, ZESTRIL) 40 mg tablet 90 Tab 1     Sig: Take 1 Tab by mouth daily.  atorvastatin (LIPITOR) 20 mg tablet 90 Tab 2     Sig: Take 1 Tab by mouth daily. [address] [facility], [phone]      They have 1 week worth of tablets remaining. Pts last appt was 4/27/18, no future appointment is scheduled. Pt aware of 72 hours time frame.

## 2018-10-02 RX ORDER — SITAGLIPTIN AND METFORMIN HYDROCHLORIDE 50; 1000 MG/1; MG/1
TABLET, FILM COATED ORAL
Qty: 180 TAB | Refills: 0 | Status: SHIPPED | OUTPATIENT
Start: 2018-10-02

## 2019-01-29 DIAGNOSIS — K76.0 FATTY LIVER: ICD-10-CM

## 2019-01-29 DIAGNOSIS — R74.8 ELEVATED LIVER ENZYMES: ICD-10-CM

## 2019-01-29 DIAGNOSIS — E78.5 DYSLIPIDEMIA: ICD-10-CM

## 2019-01-29 NOTE — TELEPHONE ENCOUNTER
Pharmacy faxed a refill request for,  Requested Prescriptions     Pending Prescriptions Disp Refills    ezetimibe (ZETIA) 10 mg tablet 90 Tab 1     Sig: Take 1 Tab by mouth daily.  Repeat liver and lipid labs needed one month     Last office visit 4/27/18,  Next appt 2/19/18    Please advise

## 2019-01-31 RX ORDER — EZETIMIBE 10 MG/1
10 TABLET ORAL DAILY
Qty: 90 TAB | Refills: 1 | Status: SHIPPED | OUTPATIENT
Start: 2019-01-31

## 2019-04-01 ENCOUNTER — TELEPHONE (OUTPATIENT)
Dept: FAMILY MEDICINE CLINIC | Age: 44
End: 2019-04-01

## 2019-04-01 NOTE — TELEPHONE ENCOUNTER
Received rx refill request for patient's atorvastatin. Patient was scheduled for an [facility] care appt with Dr. Salvador Zamarripa 04/01/2019 but did not show up.